# Patient Record
Sex: FEMALE | Race: WHITE | NOT HISPANIC OR LATINO | Employment: OTHER | URBAN - METROPOLITAN AREA
[De-identification: names, ages, dates, MRNs, and addresses within clinical notes are randomized per-mention and may not be internally consistent; named-entity substitution may affect disease eponyms.]

---

## 2018-11-08 ENCOUNTER — TRANSCRIBE ORDERS (OUTPATIENT)
Dept: NEUROSURGERY | Facility: CLINIC | Age: 73
End: 2018-11-08

## 2018-11-08 DIAGNOSIS — M54.50 LOWER BACK PAIN: Primary | ICD-10-CM

## 2018-11-09 RX ORDER — OXYCODONE HYDROCHLORIDE 5 MG/1
5 TABLET ORAL
Refills: 0 | COMMUNITY
Start: 2018-11-01

## 2018-11-09 RX ORDER — RANITIDINE 300 MG/1
300 TABLET ORAL
Refills: 2 | COMMUNITY
Start: 2018-10-25

## 2018-11-09 RX ORDER — OMEPRAZOLE 40 MG/1
CAPSULE, DELAYED RELEASE ORAL
Refills: 3 | COMMUNITY
Start: 2018-10-31

## 2018-11-09 RX ORDER — HYDROCHLOROTHIAZIDE 25 MG/1
25 TABLET ORAL EVERY MORNING
Refills: 3 | COMMUNITY
Start: 2018-10-03 | End: 2018-11-12

## 2018-11-09 RX ORDER — AMLODIPINE BESYLATE 5 MG/1
5 TABLET ORAL DAILY
Refills: 3 | COMMUNITY
Start: 2018-10-31

## 2018-11-09 RX ORDER — INFLUENZA A VIRUSA/MICHIGAN/45/2015 X-275 (H1N1) ANTIGEN (FORMALDEHYDE INACTIVATED), INFLUENZA A VIRUS A/HONG KONG/4801/2014 X-263B (H3N2) ANTIGEN (FORMALDEHYDE INACTIVATED), AND INFLUENZA B VIRUS B/BRISBANE/60/2008 ANTIGEN (FORMALDEHYDE INACTIVATED) 60; 60; 60 UG/.5ML; UG/.5ML; UG/.5ML
INJECTION, SUSPENSION INTRAMUSCULAR
Refills: 0 | COMMUNITY
Start: 2018-09-25 | End: 2018-11-12

## 2018-11-12 ENCOUNTER — OFFICE VISIT (OUTPATIENT)
Dept: NEUROSURGERY | Facility: CLINIC | Age: 73
End: 2018-11-12
Payer: MEDICARE

## 2018-11-12 VITALS
HEIGHT: 64 IN | HEART RATE: 66 BPM | RESPIRATION RATE: 16 BRPM | WEIGHT: 161 LBS | TEMPERATURE: 97.9 F | SYSTOLIC BLOOD PRESSURE: 110 MMHG | BODY MASS INDEX: 27.49 KG/M2 | DIASTOLIC BLOOD PRESSURE: 76 MMHG

## 2018-11-12 DIAGNOSIS — M46.1 SACROILIITIS, NOT ELSEWHERE CLASSIFIED (HCC): ICD-10-CM

## 2018-11-12 DIAGNOSIS — G89.4 CHRONIC PAIN SYNDROME: Primary | ICD-10-CM

## 2018-11-12 DIAGNOSIS — M54.50 LOWER BACK PAIN: ICD-10-CM

## 2018-11-12 DIAGNOSIS — S32.009G: ICD-10-CM

## 2018-11-12 DIAGNOSIS — M48.062 NEUROGENIC CLAUDICATION DUE TO LUMBAR SPINAL STENOSIS: ICD-10-CM

## 2018-11-12 PROCEDURE — 99204 OFFICE O/P NEW MOD 45 MIN: CPT | Performed by: NEUROLOGICAL SURGERY

## 2018-11-12 RX ORDER — LIDOCAINE 50 MG/G
PATCH TOPICAL
COMMUNITY
Start: 2016-05-19

## 2018-11-12 RX ORDER — BRIMONIDINE TARTRATE 0.15 %
1 DROPS OPHTHALMIC (EYE)
COMMUNITY

## 2018-11-12 RX ORDER — PETROLATUM,WHITE/LANOLIN
OINTMENT (GRAM) TOPICAL
COMMUNITY

## 2018-11-12 RX ORDER — ALBUTEROL SULFATE 4 MG/1
TABLET ORAL
COMMUNITY
Start: 2015-06-29

## 2018-11-12 RX ORDER — ASPIRIN 81 MG/1
81 TABLET ORAL
COMMUNITY

## 2018-11-12 RX ORDER — TRAVOPROST OPHTHALMIC SOLUTION 0.04 MG/ML
SOLUTION OPHTHALMIC
COMMUNITY
Start: 2015-06-02

## 2018-11-12 RX ORDER — CHLORAL HYDRATE 500 MG
1000 CAPSULE ORAL
COMMUNITY

## 2018-11-12 RX ORDER — EPINEPHRINE 0.3 MG/.3ML
INJECTION SUBCUTANEOUS
COMMUNITY
Start: 2016-06-02

## 2018-11-12 RX ORDER — COLESEVELAM HYDROCHLORIDE 3.75 G/1
POWDER, FOR SUSPENSION ORAL
COMMUNITY

## 2018-11-12 RX ORDER — NICOTINE POLACRILEX 2 MG
GUM BUCCAL
COMMUNITY

## 2018-11-12 RX ORDER — MONTELUKAST SODIUM 10 MG/1
TABLET ORAL
COMMUNITY
Start: 2015-06-26

## 2018-11-12 RX ORDER — PREDNISONE 1 MG/1
3 TABLET ORAL
COMMUNITY
Start: 2018-01-18

## 2018-11-12 RX ORDER — OSELTAMIVIR PHOSPHATE 75 MG/1
CAPSULE ORAL
COMMUNITY
Start: 2018-02-05

## 2018-11-12 NOTE — PROGRESS NOTES
Assessment/Plan:    No problem-specific Assessment & Plan notes found for this encounter  Diagnoses and all orders for this visit:    Chronic pain syndrome    Lower back pain  -     Ambulatory referral to Neurosurgery    Neurogenic claudication due to lumbar spinal stenosis    [facility], [facility]    Closed fracture of spinous process of lumbar vertebra with delayed healing    Other orders  -     amLODIPine (NORVASC) 5 mg tablet; Take 5 mg by mouth daily  -     Discontinue: hydrochlorothiazide (HYDRODIURIL) 25 mg tablet; Take 25 mg by mouth every morning  -     oxyCODONE (ROXICODONE) 5 mg immediate release tablet; Take 5 mg by mouth every 4-6 hours as needed  -     Discontinue: FLUZONE HIGH-DOSE 0 5 ML BILLIE; TO BE ADMINISTERED BY PHARMACIST FOR IMMUNIZATION  -     ranitidine (ZANTAC) 300 MG tablet; Take 300 mg by mouth daily at bedtime  -     omeprazole (PriLOSEC) 40 MG capsule; TAKE 1 CAPSULE BY MOUTH TWICE A DAY  -     albuterol 4 mg tablet;   -     aspirin (ECOTRIN LOW STRENGTH) 81 mg EC tablet; Take 81 mg by mouth  -     B Complex Vitamins (B COMPLEX 1 PO); Take 1 tablet by mouth daily  -     Biotin 1 MG CAPS; Take by mouth  -     Besifloxacin HCl (BESIVANCE) 0 6 % SUSP;   -     brimonidine (ALPHAGAN P) 0 15 % ophthalmic solution; 1 drop  -     Brinzolamide-Brimonidine 1-0 2 % SUSP; Apply 1 drop to eye  -     Calcium Carb-Cholecalciferol (CALCIUM CARBONATE-VITAMIN D3 PO); Take by mouth  -     Colesevelam HCl 3 75 g PACK; Take by mouth  -     EPINEPHrine (EPIPEN) 0 3 mg/0 3 mL SOAJ; Inject as directed  -     Flaxseed Oil OIL; Take 2 capsules by mouth  -     Garlic 10 MG CAPS; Take 2 capsules by mouth  -     Glucosamine Sulfate 1000 MG CAPS; Take by mouth  -     Krill Oil 300 MG CAPS; Take by mouth  -     lidocaine (LIDODERM) 5 %;  Apply topically  -     montelukast (SINGULAIR) 10 mg tablet;   -     MULTIPLE VITAMINS-MINERALS ER PO; Take by mouth  -     Omega-3 Fatty Acids (FISH OIL) 1,000 mg; Take 1,000 mg by mouth  -     oseltamivir (TAMIFLU) 75 mg capsule;   -     Plant Sterols and Stanols 450 MG CAPS; Take by mouth  -     Polyvinyl Alcohol-Povidone PF 1 4-0 6 % SOLN; Apply to eye  -     predniSONE 5 mg tablet; 3 mg  -     tocilizumab (ACTEMRA) 400 mg/20 mL injection; Infuse into a venous catheter  -     travoprost (TRAVATAN Z) 0 004 % ophthalmic solution;   -     vitamin E, tocopherol, 200 units capsule; Take 400 Units by mouth daily       Summary: This is a 54-year-old female with a history of lumbar stenosis with neurogenic claudication  She is also known to have very poor bone quality given 4 years of use for prednisone for her giant cell arteritis  She is approximately 6 weeks from placement of an Vertiflex Superion interspinous device at L2-3 and L3-4 by Dr John Aguilar  She reports having near complete resolution of her pre- procedure symptoms which included aching low back pain, difficulty standing and walking, and sharp pain in her left leg  She is now most concerned with a right-sided upper buttock paraspinal sharp pain which is present regardless of position  She subsequently developed a spinous process fracture at L2 after placement of the device  She appears to have had increased pain several days following the procedure that she described as dull and aching, but this resolved  There are no treatments for spinous process fractures  Conservative measures are utilized  It appears that the discomfort from the spinous process fracture has resolved as she denies any midline pain, as well as dull or aching axial/mechanical pain  She has had complete resolution of her neurogenic claudication symptoms and will need to work with physical therapy for her deconditioning  Her CT imaging demonstrates that the device is in appropriate position at both levels  Spinous process fracture is in line with the L2-3 interspaceer device, but it has not dislodged    The fracture is well aligned  Given these findings, resolution of pre-procedure symptoms,  and lack of pain from fracture, I have recommended observation  I would recommend she follow up with her pain specialist, Dr Ivone Pink, who she has seen in the past for SI joint injections on the right side  She reports this present pain pattern is consistent with her sacroiliitis  Further discussion was held with the patient and wife in reference to poor bone quality and the options of open surgery which could potentially include decompression, and/or fusion  I would need to review MR imaging of her lumbar spine to  the degree of stenosis  However, I do not feel that this is a viable option for her given her prednisone use and autoimmune injection medication  She will follow-up as needed with me  Greater than 65 min with the patient in which greater than 50% in care coordination  Subjective:      Patient ID: Lorraine Carvajal is a 68 y o  female  HPI     This is a very pleasant 60-year-old female with a history of lower back pain, as well as difficulty with standing and walking  She was found to have lumbar spinal stenosis with neurogenic claudication  She is now approximately 6 weeks from placement of an Vertiflex Superion interspinous device at L2-3 and L3-4 by Dr Yamini Bradford  She subsequently developed a spinous process fracture at L2 after placement of the device  She reports that her chronic symptoms that included an aching lower back pain, as well as sharp left leg pain, and difficulty standing and walking have resolved since implantation of the device  She reports previously she cannot stand for greater than 5 min for which now she has increased her activity level  Subsequent to the procedure she developed a new sharp pain in the midline of her lower back that was present for several days and then resolved    She is now reporting a right-sided upper buttock pain which is sharp in nature and is always present regardless of position  This pain started several weeks after the procedure  Denies leg pain  She has a history of right-sided SI joint dysfunction and has undergone SI joint injections in the past with significant relief  She has a complicated history of giant cell arteritis and has been on prednisone for 4 years, as well as an autoimmune injection  She has avascular necrosis of the right shoulder  She has a history of a thyroid plasty  She also reports problems with her bilateral knees  She had a laproscopic cholecystectomy performed 3 weeks ago as well  She takes oxycodone 3-4 times per day for her pain  The following portions of the patient's history were reviewed and updated as appropriate: allergies, current medications, past family history, past medical history, past social history and past surgical history  Review of Systems   Constitutional: Positive for appetite change (loss of appetite) and fatigue  HENT:        Thyroidplasty   Eyes:        Glacoma   Dry eye   Respiratory:        Asthma  O2 on hold by pulmonary   Cardiovascular:        Vasovagal syncope   Gastrointestinal: Positive for constipation, nausea and vomiting (about every morning when waking up)  Gall bladder removal sx   Endocrine: Negative  Musculoskeletal: Positive for back pain ( 2 Vertiflex placed for lBP on 9/19/18) and gait problem (bilateral leg weakness)  Skin: Negative  Allergic/Immunologic: Positive for environmental allergies and food allergies (cocoa, dairy)  Neurological: Positive for dizziness, syncope (three days ago reports she felt she was goiong to pass out ) and weakness (bilateral leg)  Hematological:        Fish oil  Krill  Flaxseed oil  ASA 81   Psychiatric/Behavioral: Negative            Objective:      /76 (BP Location: Left arm)   Pulse 66   Temp 97 9 °F (36 6 °C) (Tympanic)   Resp 16   Ht 5' 4" (1 626 m)   Wt 73 kg (161 lb)   BMI 27 64 kg/m²          Physical Exam Constitutional: She is oriented to person, place, and time  She appears well-developed  HENT:   Head: Normocephalic  Eyes: Pupils are equal, round, and reactive to light  Neck: Normal range of motion  Pulmonary/Chest: Effort normal    Musculoskeletal: Normal range of motion  Neurological: She is alert and oriented to person, place, and time  She has normal strength  No sensory deficit  nontender to palpation  positive compression test and thigh thrust             Results:   Reviewed imaging of lumbar x-rays September 2018  There is an interspacer device at L2-3 and L3-4  There is a spinous process fracture at the level of L2 in line with the fracure  Device slightly tilted  Reviewed imaging and report of CT lumbar spine October 2018  There is a Vertiflex Superion interspacer device at L2-3 and L3-4  There is a spinous process fracture at the level of L2 in line with the fracure  Device slightly tilted  The device is not dislodged and remains in place

## 2019-08-07 ENCOUNTER — HOSPITAL ENCOUNTER (OUTPATIENT)
Dept: RADIOLOGY | Facility: HOSPITAL | Age: 74
Discharge: HOME | End: 2019-08-07
Attending: RADIOLOGY

## 2019-08-07 DIAGNOSIS — Z76.89 REFERRAL OF PATIENT WITHOUT EXAMINATION OR TREATMENT: ICD-10-CM

## 2024-04-15 ENCOUNTER — HOSPITAL ENCOUNTER (OUTPATIENT)
Dept: HOSPITAL 99 - RPT | Age: 79
Discharge: HOME | End: 2024-04-15
Payer: MEDICARE

## 2024-04-15 DIAGNOSIS — I89.0: Primary | ICD-10-CM

## 2024-04-15 DIAGNOSIS — Z73.6: ICD-10-CM

## 2024-07-19 ENCOUNTER — HOSPITAL ENCOUNTER (OUTPATIENT)
Dept: HOSPITAL 99 - RAD | Age: 79
End: 2024-07-19
Payer: MEDICARE

## 2024-07-19 DIAGNOSIS — M19.071: Primary | ICD-10-CM

## 2024-09-26 ENCOUNTER — HOSPITAL ENCOUNTER (OUTPATIENT)
Dept: HOSPITAL 99 - RAD | Age: 79
End: 2024-09-26
Payer: MEDICARE

## 2024-09-26 DIAGNOSIS — R06.02: ICD-10-CM

## 2024-09-26 DIAGNOSIS — D86.9: ICD-10-CM

## 2024-09-26 DIAGNOSIS — R06.09: ICD-10-CM

## 2024-09-26 DIAGNOSIS — I48.0: Primary | ICD-10-CM

## 2024-09-26 DIAGNOSIS — R42: ICD-10-CM

## 2024-09-26 PROCEDURE — A9500 TC99M SESTAMIBI: HCPCS

## 2024-10-09 ENCOUNTER — HOSPITAL ENCOUNTER (OUTPATIENT)
Dept: HOSPITAL 99 - HWRCS | Age: 79
End: 2024-10-09
Payer: MEDICARE

## 2024-10-09 DIAGNOSIS — R06.09: ICD-10-CM

## 2024-10-09 DIAGNOSIS — I48.0: Primary | ICD-10-CM

## 2024-11-18 ENCOUNTER — HOSPITAL ENCOUNTER (EMERGENCY)
Dept: HOSPITAL 99 - EMR | Age: 79
Discharge: HOME | End: 2024-11-18
Payer: MEDICARE

## 2024-11-18 VITALS — DIASTOLIC BLOOD PRESSURE: 88 MMHG | SYSTOLIC BLOOD PRESSURE: 146 MMHG | RESPIRATION RATE: 16 BRPM

## 2024-11-18 VITALS — DIASTOLIC BLOOD PRESSURE: 64 MMHG | SYSTOLIC BLOOD PRESSURE: 130 MMHG

## 2024-11-18 VITALS — DIASTOLIC BLOOD PRESSURE: 79 MMHG | SYSTOLIC BLOOD PRESSURE: 148 MMHG

## 2024-11-18 VITALS — BODY MASS INDEX: 39.1 KG/M2

## 2024-11-18 VITALS — RESPIRATION RATE: 20 BRPM

## 2024-11-18 VITALS — RESPIRATION RATE: 19 BRPM

## 2024-11-18 DIAGNOSIS — Z79.52: ICD-10-CM

## 2024-11-18 DIAGNOSIS — H40.9: ICD-10-CM

## 2024-11-18 DIAGNOSIS — E78.00: ICD-10-CM

## 2024-11-18 DIAGNOSIS — Z87.891: ICD-10-CM

## 2024-11-18 DIAGNOSIS — G47.33: ICD-10-CM

## 2024-11-18 DIAGNOSIS — Z90.49: ICD-10-CM

## 2024-11-18 DIAGNOSIS — Z96.611: ICD-10-CM

## 2024-11-18 DIAGNOSIS — I48.91: ICD-10-CM

## 2024-11-18 DIAGNOSIS — J45.909: ICD-10-CM

## 2024-11-18 DIAGNOSIS — I10: ICD-10-CM

## 2024-11-18 DIAGNOSIS — M31.6: ICD-10-CM

## 2024-11-18 DIAGNOSIS — Z86.73: ICD-10-CM

## 2024-11-18 DIAGNOSIS — R10.9: Primary | ICD-10-CM

## 2024-11-18 LAB
ALBUMIN SERPL-MCNC: 4.7 G/DL (ref 3.5–5)
ALP SERPL-CCNC: 50 U/L (ref 38–126)
ALT SERPL-CCNC: 50 U/L (ref 0–35)
AST SERPL-CCNC: 43 U/L (ref 14–36)
BUN SERPL-MCNC: 24 MG/DL (ref 7–17)
CALCIUM SERPL-MCNC: 9.2 MG/DL (ref 8.4–10.2)
CHLORIDE SERPL-SCNC: 101 MMOL/L (ref 98–107)
CO2 SERPL-SCNC: 29 MMOL/L (ref 22–30)
EGFR: > 60
ERYTHROCYTE [DISTWIDTH] IN BLOOD BY AUTOMATED COUNT: 13 % (ref 11.5–14.5)
GLUCOSE SERPL-MCNC: 117 MG/DL (ref 70–99)
HCT VFR BLD AUTO: 42.3 % (ref 37–47)
HGB BLD-MCNC: 13.8 G/DL (ref 12–16)
LIPASE SERPL-CCNC: 86 U/L (ref 23–300)
MCHC RBC AUTO-ENTMCNC: 32.6 G/DL (ref 33–37)
MCV RBC AUTO: 108.5 FL (ref 81–99)
NRBC BLD AUTO-RTO: 0 %
PLATELET # BLD AUTO: 147 10^3/UL (ref 130–400)
POTASSIUM SERPL-SCNC: 4.6 MMOL/L (ref 3.5–5.1)
PROT SERPL-MCNC: 6.8 G/DL (ref 6.3–8.2)
SODIUM SERPL-SCNC: 139 MMOL/L (ref 135–145)
URINE RED BLOOD CELL: (no result) /HPF (ref 0–2)
URINE WHITE CELL: (no result) /HPF (ref 0–5)

## 2024-11-18 PROCEDURE — 96375 TX/PRO/DX INJ NEW DRUG ADDON: CPT

## 2024-11-18 PROCEDURE — 96374 THER/PROPH/DIAG INJ IV PUSH: CPT

## 2024-11-18 PROCEDURE — 99284 EMERGENCY DEPT VISIT MOD MDM: CPT

## 2024-11-18 RX ADMIN — ONDANSETRON HYDROCHLORIDE 4 MG: 2 SOLUTION INTRAMUSCULAR; INTRAVENOUS at 16:14

## 2024-11-18 RX ADMIN — HYDROMORPHONE HYDROCHLORIDE 0.5 MG: 1 INJECTION, SOLUTION INTRAMUSCULAR; INTRAVENOUS; SUBCUTANEOUS at 16:14

## 2024-11-20 VITALS — RESPIRATION RATE: 18 BRPM

## 2024-11-20 VITALS — RESPIRATION RATE: 39 BRPM

## 2024-11-20 VITALS — RESPIRATION RATE: 14 BRPM

## 2024-11-20 VITALS — RESPIRATION RATE: 14 BRPM | SYSTOLIC BLOOD PRESSURE: 140 MMHG | DIASTOLIC BLOOD PRESSURE: 67 MMHG

## 2024-11-20 VITALS — SYSTOLIC BLOOD PRESSURE: 170 MMHG | DIASTOLIC BLOOD PRESSURE: 75 MMHG | RESPIRATION RATE: 24 BRPM

## 2024-11-20 VITALS — RESPIRATION RATE: 15 BRPM | DIASTOLIC BLOOD PRESSURE: 61 MMHG | SYSTOLIC BLOOD PRESSURE: 130 MMHG

## 2024-11-20 VITALS — RESPIRATION RATE: 12 BRPM | SYSTOLIC BLOOD PRESSURE: 154 MMHG | DIASTOLIC BLOOD PRESSURE: 81 MMHG

## 2024-11-20 VITALS — RESPIRATION RATE: 11 BRPM

## 2024-11-20 VITALS — RESPIRATION RATE: 17 BRPM | SYSTOLIC BLOOD PRESSURE: 143 MMHG | DIASTOLIC BLOOD PRESSURE: 69 MMHG

## 2024-11-20 VITALS — DIASTOLIC BLOOD PRESSURE: 75 MMHG | SYSTOLIC BLOOD PRESSURE: 170 MMHG

## 2024-11-20 VITALS — RESPIRATION RATE: 15 BRPM

## 2024-11-20 VITALS — DIASTOLIC BLOOD PRESSURE: 80 MMHG | RESPIRATION RATE: 18 BRPM | SYSTOLIC BLOOD PRESSURE: 147 MMHG

## 2024-11-20 VITALS — RESPIRATION RATE: 18 BRPM | SYSTOLIC BLOOD PRESSURE: 143 MMHG | DIASTOLIC BLOOD PRESSURE: 75 MMHG

## 2024-11-20 VITALS — RESPIRATION RATE: 12 BRPM

## 2024-11-20 VITALS — RESPIRATION RATE: 9 BRPM

## 2024-11-20 VITALS — RESPIRATION RATE: 7 BRPM

## 2024-11-20 VITALS — RESPIRATION RATE: 21 BRPM

## 2024-11-20 LAB
ALBUMIN SERPL-MCNC: 4.6 G/DL (ref 3.5–5)
ALP SERPL-CCNC: 52 U/L (ref 38–126)
ALT SERPL-CCNC: 107 U/L (ref 0–35)
AST SERPL-CCNC: 79 U/L (ref 14–36)
BUN SERPL-MCNC: 18 MG/DL (ref 7–17)
CALCIUM SERPL-MCNC: 8.9 MG/DL (ref 8.4–10.2)
CHLORIDE SERPL-SCNC: 101 MMOL/L (ref 98–107)
CO2 SERPL-SCNC: 28 MMOL/L (ref 22–30)
EGFR: > 60
ERYTHROCYTE [DISTWIDTH] IN BLOOD BY AUTOMATED COUNT: 12.8 % (ref 11.5–14.5)
ESTIMATED CREATININE CLEARANCE: 73 ML/MIN
GLUCOSE SERPL-MCNC: 122 MG/DL (ref 70–99)
HCT VFR BLD AUTO: 42.2 % (ref 37–47)
HGB BLD-MCNC: 13.6 G/DL (ref 12–16)
MCHC RBC AUTO-ENTMCNC: 32.2 G/DL (ref 33–37)
MCV RBC AUTO: 109.6 FL (ref 81–99)
NRBC BLD AUTO-RTO: 0 %
PLATELET # BLD AUTO: 152 10^3/UL (ref 130–400)
POTASSIUM SERPL-SCNC: 4.1 MMOL/L (ref 3.5–5.1)
PROT SERPL-MCNC: 6.8 G/DL (ref 6.3–8.2)
SODIUM SERPL-SCNC: 139 MMOL/L (ref 135–145)

## 2024-11-20 RX ADMIN — GABAPENTIN 200 MG: 100 CAPSULE ORAL at 20:50

## 2024-11-20 RX ADMIN — BRINZOLAMIDE/BRIMONIDINE TARTRATE 1 DROP: 10; 2 SUSPENSION/ DROPS OPHTHALMIC at 18:17

## 2024-11-20 RX ADMIN — KETOROLAC TROMETHAMINE 10 MG: 15 INJECTION, SOLUTION INTRAMUSCULAR; INTRAVENOUS at 20:59

## 2024-11-20 RX ADMIN — POLYVINYL ALCOHOL, POVIDONE 2 DROPS: 14; 6 SOLUTION/ DROPS OPHTHALMIC at 17:55

## 2024-11-20 RX ADMIN — POLYETHYLENE GLYCOL 3350 5 GRAMS: 17 POWDER, FOR SOLUTION ORAL at 20:51

## 2024-11-20 RX ADMIN — HYDROMORPHONE HYDROCHLORIDE 1 MG: 1 INJECTION, SOLUTION INTRAMUSCULAR; INTRAVENOUS; SUBCUTANEOUS at 10:21

## 2024-11-20 RX ADMIN — HYDROMORPHONE HYDROCHLORIDE 0.5 MG: 1 INJECTION, SOLUTION INTRAMUSCULAR; INTRAVENOUS; SUBCUTANEOUS at 22:28

## 2024-11-20 RX ADMIN — BRINZOLAMIDE/BRIMONIDINE TARTRATE 1 DROP: 10; 2 SUSPENSION/ DROPS OPHTHALMIC at 20:52

## 2024-11-20 RX ADMIN — HYDROMORPHONE HYDROCHLORIDE 0.5 MG: 1 INJECTION, SOLUTION INTRAMUSCULAR; INTRAVENOUS; SUBCUTANEOUS at 17:48

## 2024-11-20 RX ADMIN — POLYVINYL ALCOHOL, POVIDONE 2 DROPS: 14; 6 SOLUTION/ DROPS OPHTHALMIC at 17:50

## 2024-11-20 RX ADMIN — GABAPENTIN 400 MG: 400 CAPSULE ORAL at 20:50

## 2024-11-20 RX ADMIN — POLYETHYLENE GLYCOL 3350 5 GRAMS: 17 POWDER, FOR SOLUTION ORAL at 17:43

## 2024-11-20 RX ADMIN — RIVAROXABAN 20 MG: 20 TABLET, FILM COATED ORAL at 17:45

## 2024-11-20 RX ADMIN — GABAPENTIN 400 MG: 400 CAPSULE ORAL at 17:44

## 2024-11-20 RX ADMIN — LATANOPROST 1 DROP: 50 SOLUTION OPHTHALMIC at 20:52

## 2024-11-20 RX ADMIN — POLYVINYL ALCOHOL, POVIDONE 2 DROPS: 14; 6 SOLUTION/ DROPS OPHTHALMIC at 20:51

## 2024-11-21 ENCOUNTER — HOSPITAL ENCOUNTER (INPATIENT)
Dept: HOSPITAL 99 - 4 EAST ACU | Age: 79
LOS: 2 days | Discharge: HOME HEALTH | DRG: 558 | End: 2024-11-23
Payer: MEDICARE

## 2024-11-21 VITALS — DIASTOLIC BLOOD PRESSURE: 78 MMHG | RESPIRATION RATE: 16 BRPM | SYSTOLIC BLOOD PRESSURE: 132 MMHG

## 2024-11-21 VITALS — DIASTOLIC BLOOD PRESSURE: 83 MMHG | RESPIRATION RATE: 14 BRPM | SYSTOLIC BLOOD PRESSURE: 151 MMHG

## 2024-11-21 VITALS — BODY MASS INDEX: 36.5 KG/M2 | BODY MASS INDEX: 38.1 KG/M2

## 2024-11-21 VITALS — RESPIRATION RATE: 16 BRPM | SYSTOLIC BLOOD PRESSURE: 116 MMHG | DIASTOLIC BLOOD PRESSURE: 61 MMHG

## 2024-11-21 DIAGNOSIS — I10: ICD-10-CM

## 2024-11-21 DIAGNOSIS — J45.909: ICD-10-CM

## 2024-11-21 DIAGNOSIS — Z79.01: ICD-10-CM

## 2024-11-21 DIAGNOSIS — G47.33: ICD-10-CM

## 2024-11-21 DIAGNOSIS — D84.9: ICD-10-CM

## 2024-11-21 DIAGNOSIS — M71.38: Primary | ICD-10-CM

## 2024-11-21 DIAGNOSIS — H40.9: ICD-10-CM

## 2024-11-21 DIAGNOSIS — I48.0: ICD-10-CM

## 2024-11-21 DIAGNOSIS — M31.6: ICD-10-CM

## 2024-11-21 LAB
ALBUMIN SERPL-MCNC: 3.7 G/DL (ref 3.5–5)
ALP SERPL-CCNC: 77 U/L (ref 38–126)
ALT SERPL-CCNC: 108 U/L (ref 0–35)
AST SERPL-CCNC: 60 U/L (ref 14–36)
BUN SERPL-MCNC: 17 MG/DL (ref 7–17)
CALCIUM SERPL-MCNC: 9 MG/DL (ref 8.4–10.2)
CHLORIDE SERPL-SCNC: 104 MMOL/L (ref 98–107)
CO2 SERPL-SCNC: 26 MMOL/L (ref 22–30)
EGFR: > 60
ERYTHROCYTE [DISTWIDTH] IN BLOOD BY AUTOMATED COUNT: 12.6 % (ref 11.5–14.5)
ESTIMATED CREATININE CLEARANCE: 71 ML/MIN
GLUCOSE - POINT OF CARE: 95 MG/DL (ref 70–99)
GLUCOSE SERPL-MCNC: 93 MG/DL (ref 70–99)
HCT VFR BLD AUTO: 39.8 % (ref 37–47)
HGB BLD-MCNC: 12.8 G/DL (ref 12–16)
MCHC RBC AUTO-ENTMCNC: 32.2 G/DL (ref 33–37)
MCV RBC AUTO: 108.4 FL (ref 81–99)
NRBC BLD AUTO-RTO: 0 %
PLATELET # BLD AUTO: 146 10^3/UL (ref 130–400)
POTASSIUM SERPL-SCNC: 4.1 MMOL/L (ref 3.5–5.1)
PROT SERPL-MCNC: 5.7 G/DL (ref 6.3–8.2)
SODIUM SERPL-SCNC: 138 MMOL/L (ref 135–145)

## 2024-11-21 PROCEDURE — A9575 INJ GADOTERATE MEGLUMI 0.1ML: HCPCS

## 2024-11-21 RX ADMIN — GABAPENTIN 400 MG: 400 CAPSULE ORAL at 12:46

## 2024-11-21 RX ADMIN — POLYVINYL ALCOHOL, POVIDONE 2 DROPS: 14; 6 SOLUTION/ DROPS OPHTHALMIC at 17:00

## 2024-11-21 RX ADMIN — GABAPENTIN 200 MG: 100 CAPSULE ORAL at 21:26

## 2024-11-21 RX ADMIN — BRINZOLAMIDE/BRIMONIDINE TARTRATE 1 DROP: 10; 2 SUSPENSION/ DROPS OPHTHALMIC at 13:16

## 2024-11-21 RX ADMIN — RIVAROXABAN 20 MG: 20 TABLET, FILM COATED ORAL at 16:59

## 2024-11-21 RX ADMIN — POLYVINYL ALCOHOL, POVIDONE 2 DROPS: 14; 6 SOLUTION/ DROPS OPHTHALMIC at 12:46

## 2024-11-21 RX ADMIN — POLYVINYL ALCOHOL, POVIDONE 2 DROPS: 14; 6 SOLUTION/ DROPS OPHTHALMIC at 21:25

## 2024-11-21 RX ADMIN — POLYETHYLENE GLYCOL 3350 5 GRAMS: 17 POWDER, FOR SOLUTION ORAL at 09:57

## 2024-11-21 RX ADMIN — KETOROLAC TROMETHAMINE 10 MG: 15 INJECTION, SOLUTION INTRAMUSCULAR; INTRAVENOUS at 14:32

## 2024-11-21 RX ADMIN — Medication 1000 MG: at 09:57

## 2024-11-21 RX ADMIN — HYDROMORPHONE HYDROCHLORIDE 0.5 MG: 1 INJECTION, SOLUTION INTRAMUSCULAR; INTRAVENOUS; SUBCUTANEOUS at 06:42

## 2024-11-21 RX ADMIN — BRINZOLAMIDE/BRIMONIDINE TARTRATE 1 DROP: 10; 2 SUSPENSION/ DROPS OPHTHALMIC at 17:02

## 2024-11-21 RX ADMIN — KETOROLAC TROMETHAMINE 10 MG: 15 INJECTION, SOLUTION INTRAMUSCULAR; INTRAVENOUS at 03:15

## 2024-11-21 RX ADMIN — HYDROMORPHONE HYDROCHLORIDE 0.5 MG: 1 INJECTION, SOLUTION INTRAMUSCULAR; INTRAVENOUS; SUBCUTANEOUS at 10:49

## 2024-11-21 RX ADMIN — POLYVINYL ALCOHOL, POVIDONE: 14; 6 SOLUTION/ DROPS OPHTHALMIC at 17:06

## 2024-11-21 RX ADMIN — GABAPENTIN 400 MG: 400 CAPSULE ORAL at 09:56

## 2024-11-21 RX ADMIN — POLYETHYLENE GLYCOL 3350 5 GRAMS: 17 POWDER, FOR SOLUTION ORAL at 17:00

## 2024-11-21 RX ADMIN — GABAPENTIN 400 MG: 400 CAPSULE ORAL at 16:59

## 2024-11-21 RX ADMIN — BRINZOLAMIDE/BRIMONIDINE TARTRATE 1 DROP: 10; 2 SUSPENSION/ DROPS OPHTHALMIC at 09:58

## 2024-11-21 RX ADMIN — POLYVINYL ALCOHOL, POVIDONE 2 DROPS: 14; 6 SOLUTION/ DROPS OPHTHALMIC at 09:57

## 2024-11-21 RX ADMIN — METOPROLOL SUCCINATE 25 MG: 25 TABLET, FILM COATED, EXTENDED RELEASE ORAL at 09:58

## 2024-11-21 RX ADMIN — KETOROLAC TROMETHAMINE 10 MG: 15 INJECTION, SOLUTION INTRAMUSCULAR; INTRAVENOUS at 23:02

## 2024-11-21 RX ADMIN — Medication 400 UNITS: at 09:57

## 2024-11-21 RX ADMIN — GABAPENTIN 400 MG: 400 CAPSULE ORAL at 21:26

## 2024-11-21 RX ADMIN — PREDNISONE 5 MG: 5 TABLET ORAL at 09:57

## 2024-11-21 RX ADMIN — PANTOPRAZOLE SODIUM 40 MG: 40 TABLET, DELAYED RELEASE ORAL at 09:57

## 2024-11-21 RX ADMIN — POLYETHYLENE GLYCOL 3350 5 GRAMS: 17 POWDER, FOR SOLUTION ORAL at 21:25

## 2024-11-21 RX ADMIN — HYDROMORPHONE HYDROCHLORIDE 0.5 MG: 1 INJECTION, SOLUTION INTRAMUSCULAR; INTRAVENOUS; SUBCUTANEOUS at 20:25

## 2024-11-21 RX ADMIN — LATANOPROST 1 DROP: 50 SOLUTION OPHTHALMIC at 21:27

## 2024-11-22 VITALS — DIASTOLIC BLOOD PRESSURE: 77 MMHG | HEART RATE: 71 BPM | SYSTOLIC BLOOD PRESSURE: 129 MMHG | OXYGEN SATURATION: 92 %

## 2024-11-22 VITALS — SYSTOLIC BLOOD PRESSURE: 106 MMHG | RESPIRATION RATE: 18 BRPM | DIASTOLIC BLOOD PRESSURE: 88 MMHG

## 2024-11-22 VITALS — SYSTOLIC BLOOD PRESSURE: 166 MMHG | RESPIRATION RATE: 18 BRPM | DIASTOLIC BLOOD PRESSURE: 70 MMHG

## 2024-11-22 VITALS — DIASTOLIC BLOOD PRESSURE: 89 MMHG | RESPIRATION RATE: 18 BRPM | SYSTOLIC BLOOD PRESSURE: 169 MMHG

## 2024-11-22 RX ADMIN — PREDNISONE 5 MG: 5 TABLET ORAL at 08:57

## 2024-11-22 RX ADMIN — Medication 1000 MG: at 08:56

## 2024-11-22 RX ADMIN — TRAMADOL HYDROCHLORIDE 25 MG: 50 TABLET, COATED ORAL at 16:55

## 2024-11-22 RX ADMIN — Medication 400 UNITS: at 08:56

## 2024-11-22 RX ADMIN — GABAPENTIN 400 MG: 400 CAPSULE ORAL at 12:53

## 2024-11-22 RX ADMIN — BRINZOLAMIDE/BRIMONIDINE TARTRATE 1 DROP: 10; 2 SUSPENSION/ DROPS OPHTHALMIC at 12:53

## 2024-11-22 RX ADMIN — POLYVINYL ALCOHOL, POVIDONE 2 DROPS: 14; 6 SOLUTION/ DROPS OPHTHALMIC at 08:57

## 2024-11-22 RX ADMIN — POLYETHYLENE GLYCOL 3350 5 GRAMS: 17 POWDER, FOR SOLUTION ORAL at 08:56

## 2024-11-22 RX ADMIN — BRINZOLAMIDE/BRIMONIDINE TARTRATE 1 DROP: 10; 2 SUSPENSION/ DROPS OPHTHALMIC at 16:55

## 2024-11-22 RX ADMIN — Medication 1 FLUSH: at 13:12

## 2024-11-22 RX ADMIN — HYDROMORPHONE HYDROCHLORIDE 0.5 MG: 1 INJECTION, SOLUTION INTRAMUSCULAR; INTRAVENOUS; SUBCUTANEOUS at 06:35

## 2024-11-22 RX ADMIN — KETOROLAC TROMETHAMINE 10 MG: 15 INJECTION, SOLUTION INTRAMUSCULAR; INTRAVENOUS at 20:02

## 2024-11-22 RX ADMIN — GABAPENTIN 400 MG: 400 CAPSULE ORAL at 21:12

## 2024-11-22 RX ADMIN — GABAPENTIN 200 MG: 100 CAPSULE ORAL at 21:12

## 2024-11-22 RX ADMIN — METOPROLOL SUCCINATE 25 MG: 25 TABLET, FILM COATED, EXTENDED RELEASE ORAL at 08:56

## 2024-11-22 RX ADMIN — POLYETHYLENE GLYCOL 3350 5 GRAMS: 17 POWDER, FOR SOLUTION ORAL at 16:54

## 2024-11-22 RX ADMIN — DIPHENHYDRAMINE HYDROCHLORIDE 25 MG: 25 CAPSULE ORAL at 21:22

## 2024-11-22 RX ADMIN — PANTOPRAZOLE SODIUM 40 MG: 40 TABLET, DELAYED RELEASE ORAL at 08:57

## 2024-11-22 RX ADMIN — POLYVINYL ALCOHOL, POVIDONE: 14; 6 SOLUTION/ DROPS OPHTHALMIC at 16:05

## 2024-11-22 RX ADMIN — RIVAROXABAN 20 MG: 20 TABLET, FILM COATED ORAL at 17:20

## 2024-11-22 RX ADMIN — GABAPENTIN 400 MG: 400 CAPSULE ORAL at 08:57

## 2024-11-22 RX ADMIN — KETOROLAC TROMETHAMINE 10 MG: 15 INJECTION, SOLUTION INTRAMUSCULAR; INTRAVENOUS at 09:02

## 2024-11-22 RX ADMIN — LATANOPROST 1 DROP: 50 SOLUTION OPHTHALMIC at 21:13

## 2024-11-22 RX ADMIN — POLYVINYL ALCOHOL, POVIDONE 2 DROPS: 14; 6 SOLUTION/ DROPS OPHTHALMIC at 12:54

## 2024-11-22 RX ADMIN — GABAPENTIN 400 MG: 400 CAPSULE ORAL at 17:20

## 2024-11-22 RX ADMIN — BRINZOLAMIDE/BRIMONIDINE TARTRATE 1 DROP: 10; 2 SUSPENSION/ DROPS OPHTHALMIC at 09:02

## 2024-11-22 RX ADMIN — POLYVINYL ALCOHOL, POVIDONE 2 DROPS: 14; 6 SOLUTION/ DROPS OPHTHALMIC at 21:13

## 2024-11-22 RX ADMIN — POLYVINYL ALCOHOL, POVIDONE 2 DROPS: 14; 6 SOLUTION/ DROPS OPHTHALMIC at 17:21

## 2024-11-22 RX ADMIN — HYDROMORPHONE HYDROCHLORIDE 0.5 MG: 1 INJECTION, SOLUTION INTRAMUSCULAR; INTRAVENOUS; SUBCUTANEOUS at 13:12

## 2024-11-22 RX ADMIN — POLYETHYLENE GLYCOL 3350 5 GRAMS: 17 POWDER, FOR SOLUTION ORAL at 21:13

## 2024-11-23 VITALS — DIASTOLIC BLOOD PRESSURE: 82 MMHG | RESPIRATION RATE: 20 BRPM | SYSTOLIC BLOOD PRESSURE: 153 MMHG

## 2024-11-23 VITALS — SYSTOLIC BLOOD PRESSURE: 153 MMHG | DIASTOLIC BLOOD PRESSURE: 82 MMHG | RESPIRATION RATE: 20 BRPM

## 2024-11-23 LAB
ALBUMIN SERPL-MCNC: 3.9 G/DL (ref 3.5–5)
ALP SERPL-CCNC: 55 U/L (ref 38–126)
ALT SERPL-CCNC: 73 U/L (ref 0–35)
AST SERPL-CCNC: 35 U/L (ref 14–36)
BUN SERPL-MCNC: 29 MG/DL (ref 7–17)
CALCIUM SERPL-MCNC: 9.6 MG/DL (ref 8.4–10.2)
CHLORIDE SERPL-SCNC: 104 MMOL/L (ref 98–107)
CO2 SERPL-SCNC: 26 MMOL/L (ref 22–30)
EGFR: > 60
ERYTHROCYTE [DISTWIDTH] IN BLOOD BY AUTOMATED COUNT: 12.5 % (ref 11.5–14.5)
ESTIMATED CREATININE CLEARANCE: 62 ML/MIN
GLUCOSE SERPL-MCNC: 101 MG/DL (ref 70–99)
HCT VFR BLD AUTO: 39.9 % (ref 37–47)
HGB BLD-MCNC: 13.6 G/DL (ref 12–16)
MCHC RBC AUTO-ENTMCNC: 34.1 G/DL (ref 33–37)
MCV RBC AUTO: 106.4 FL (ref 81–99)
NRBC BLD AUTO-RTO: 0 %
PLATELET # BLD AUTO: 149 10^3/UL (ref 130–400)
POTASSIUM SERPL-SCNC: 4.3 MMOL/L (ref 3.5–5.1)
PROT SERPL-MCNC: 6 G/DL (ref 6.3–8.2)
SODIUM SERPL-SCNC: 140 MMOL/L (ref 135–145)

## 2024-11-23 RX ADMIN — Medication 400 UNITS: at 08:52

## 2024-11-23 RX ADMIN — BRINZOLAMIDE/BRIMONIDINE TARTRATE 1 DROP: 10; 2 SUSPENSION/ DROPS OPHTHALMIC at 12:09

## 2024-11-23 RX ADMIN — PANTOPRAZOLE SODIUM 40 MG: 40 TABLET, DELAYED RELEASE ORAL at 08:52

## 2024-11-23 RX ADMIN — TRAMADOL HYDROCHLORIDE 25 MG: 50 TABLET, COATED ORAL at 04:04

## 2024-11-23 RX ADMIN — POLYVINYL ALCOHOL, POVIDONE 2 DROPS: 14; 6 SOLUTION/ DROPS OPHTHALMIC at 12:09

## 2024-11-23 RX ADMIN — GABAPENTIN 400 MG: 400 CAPSULE ORAL at 12:09

## 2024-11-23 RX ADMIN — TRAMADOL HYDROCHLORIDE 25 MG: 50 TABLET, COATED ORAL at 15:35

## 2024-11-23 RX ADMIN — POLYVINYL ALCOHOL, POVIDONE 2 DROPS: 14; 6 SOLUTION/ DROPS OPHTHALMIC at 08:53

## 2024-11-23 RX ADMIN — POLYVINYL ALCOHOL, POVIDONE: 14; 6 SOLUTION/ DROPS OPHTHALMIC at 15:37

## 2024-11-23 RX ADMIN — METOPROLOL SUCCINATE 25 MG: 25 TABLET, FILM COATED, EXTENDED RELEASE ORAL at 08:52

## 2024-11-23 RX ADMIN — BRINZOLAMIDE/BRIMONIDINE TARTRATE 1 DROP: 10; 2 SUSPENSION/ DROPS OPHTHALMIC at 08:53

## 2024-11-23 RX ADMIN — POLYETHYLENE GLYCOL 3350 5 GRAMS: 17 POWDER, FOR SOLUTION ORAL at 08:52

## 2024-11-23 RX ADMIN — PREDNISONE 5 MG: 5 TABLET ORAL at 08:52

## 2024-11-23 RX ADMIN — KETOROLAC TROMETHAMINE 10 MG: 15 INJECTION, SOLUTION INTRAMUSCULAR; INTRAVENOUS at 08:54

## 2024-11-23 RX ADMIN — GABAPENTIN 400 MG: 400 CAPSULE ORAL at 08:52

## 2024-11-23 RX ADMIN — Medication 1000 MG: at 08:52

## 2024-12-12 VITALS — RESPIRATION RATE: 18 BRPM | SYSTOLIC BLOOD PRESSURE: 130 MMHG | DIASTOLIC BLOOD PRESSURE: 90 MMHG

## 2024-12-12 VITALS — RESPIRATION RATE: 17 BRPM | SYSTOLIC BLOOD PRESSURE: 138 MMHG | DIASTOLIC BLOOD PRESSURE: 104 MMHG

## 2024-12-12 VITALS — DIASTOLIC BLOOD PRESSURE: 81 MMHG | RESPIRATION RATE: 16 BRPM | SYSTOLIC BLOOD PRESSURE: 146 MMHG

## 2024-12-12 VITALS — RESPIRATION RATE: 11 BRPM

## 2024-12-12 VITALS — RESPIRATION RATE: 15 BRPM

## 2024-12-12 VITALS — RESPIRATION RATE: 16 BRPM | DIASTOLIC BLOOD PRESSURE: 80 MMHG | SYSTOLIC BLOOD PRESSURE: 151 MMHG

## 2024-12-12 VITALS — RESPIRATION RATE: 14 BRPM

## 2024-12-12 VITALS — RESPIRATION RATE: 18 BRPM | DIASTOLIC BLOOD PRESSURE: 96 MMHG | SYSTOLIC BLOOD PRESSURE: 152 MMHG

## 2024-12-12 VITALS — DIASTOLIC BLOOD PRESSURE: 90 MMHG | SYSTOLIC BLOOD PRESSURE: 130 MMHG

## 2024-12-12 VITALS — DIASTOLIC BLOOD PRESSURE: 89 MMHG | SYSTOLIC BLOOD PRESSURE: 160 MMHG

## 2024-12-12 LAB
ALBUMIN SERPL-MCNC: 3.8 G/DL (ref 3.5–5)
ALP SERPL-CCNC: 37 U/L (ref 38–126)
ALT SERPL-CCNC: 35 U/L (ref 0–35)
AST SERPL-CCNC: 38 U/L (ref 14–36)
BUN SERPL-MCNC: 12 MG/DL (ref 7–17)
CALCIUM SERPL-MCNC: 7.6 MG/DL (ref 8.4–10.2)
CHLORIDE SERPL-SCNC: 106 MMOL/L (ref 98–107)
CO2 SERPL-SCNC: 20 MMOL/L (ref 22–30)
EGFR: > 60
ERYTHROCYTE [DISTWIDTH] IN BLOOD BY AUTOMATED COUNT: 12.1 % (ref 11.5–14.5)
GLUCOSE SERPL-MCNC: 131 MG/DL (ref 70–99)
HCT VFR BLD AUTO: 42.6 % (ref 37–47)
HGB BLD-MCNC: 14 G/DL (ref 12–16)
MCHC RBC AUTO-ENTMCNC: 32.9 G/DL (ref 33–37)
MCV RBC AUTO: 106.2 FL (ref 81–99)
NRBC BLD AUTO-RTO: 0 %
PLATELET # BLD AUTO: 138 10^3/UL (ref 130–400)
POTASSIUM SERPL-SCNC: 3.7 MMOL/L (ref 3.5–5.1)
PROT SERPL-MCNC: 6.1 G/DL (ref 6.3–8.2)
SODIUM SERPL-SCNC: 137 MMOL/L (ref 135–145)

## 2024-12-12 RX ADMIN — POLYVINYL ALCOHOL, POVIDONE 2 DROPS: 14; 6 SOLUTION/ DROPS OPHTHALMIC at 18:10

## 2024-12-12 RX ADMIN — POLYETHYLENE GLYCOL 3350: 17 POWDER, FOR SOLUTION ORAL at 23:01

## 2024-12-12 RX ADMIN — RIVAROXABAN 20 MG: 20 TABLET, FILM COATED ORAL at 18:10

## 2024-12-12 RX ADMIN — BENZONATATE 100 MG: 100 CAPSULE ORAL at 22:54

## 2024-12-12 RX ADMIN — POLYVINYL ALCOHOL, POVIDONE: 14; 6 SOLUTION/ DROPS OPHTHALMIC at 23:09

## 2024-12-12 RX ADMIN — DEXAMETHASONE SODIUM PHOSPHATE 6 MG: 4 INJECTION, SOLUTION INTRA-ARTICULAR; INTRALESIONAL; INTRAMUSCULAR; INTRAVENOUS; SOFT TISSUE at 16:33

## 2024-12-12 RX ADMIN — ONDANSETRON HYDROCHLORIDE 4 MG: 2 SOLUTION INTRAMUSCULAR; INTRAVENOUS at 16:33

## 2024-12-12 RX ADMIN — GABAPENTIN 400 MG: 400 CAPSULE ORAL at 22:54

## 2024-12-12 RX ADMIN — BRINZOLAMIDE/BRIMONIDINE TARTRATE 1 DROP: 10; 2 SUSPENSION/ DROPS OPHTHALMIC at 22:54

## 2024-12-12 RX ADMIN — OXYCODONE HYDROCHLORIDE 10 MG: 10 TABLET ORAL at 22:55

## 2024-12-12 RX ADMIN — SODIUM CHLORIDE 1000: 900 INJECTION, SOLUTION INTRAVENOUS at 14:17

## 2024-12-12 RX ADMIN — GABAPENTIN 100 MG: 100 CAPSULE ORAL at 22:54

## 2024-12-12 RX ADMIN — POLYVINYL ALCOHOL, POVIDONE: 14; 6 SOLUTION/ DROPS OPHTHALMIC at 22:54

## 2024-12-12 RX ADMIN — IBUPROFEN 400 MG: 400 TABLET, FILM COATED ORAL at 18:09

## 2024-12-13 ENCOUNTER — HOSPITAL ENCOUNTER (INPATIENT)
Dept: HOSPITAL 99 - 2 NORTH | Age: 79
LOS: 2 days | Discharge: HOME HEALTH | DRG: 178 | End: 2024-12-15
Payer: MEDICARE

## 2024-12-13 VITALS — RESPIRATION RATE: 18 BRPM | DIASTOLIC BLOOD PRESSURE: 92 MMHG | SYSTOLIC BLOOD PRESSURE: 150 MMHG

## 2024-12-13 VITALS — RESPIRATION RATE: 18 BRPM | SYSTOLIC BLOOD PRESSURE: 107 MMHG | DIASTOLIC BLOOD PRESSURE: 69 MMHG

## 2024-12-13 VITALS — RESPIRATION RATE: 18 BRPM | DIASTOLIC BLOOD PRESSURE: 70 MMHG | SYSTOLIC BLOOD PRESSURE: 120 MMHG

## 2024-12-13 VITALS — OXYGEN SATURATION: 97 % | DIASTOLIC BLOOD PRESSURE: 92 MMHG | HEART RATE: 81 BPM | SYSTOLIC BLOOD PRESSURE: 126 MMHG

## 2024-12-13 VITALS — SYSTOLIC BLOOD PRESSURE: 165 MMHG | RESPIRATION RATE: 18 BRPM | DIASTOLIC BLOOD PRESSURE: 98 MMHG

## 2024-12-13 VITALS — BODY MASS INDEX: 36.1 KG/M2

## 2024-12-13 DIAGNOSIS — Z90.49: ICD-10-CM

## 2024-12-13 DIAGNOSIS — R53.1: ICD-10-CM
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
## 2024-12-13 DIAGNOSIS — Z88.0: ICD-10-CM

## 2024-12-13 DIAGNOSIS — R74.01: ICD-10-CM

## 2024-12-13 DIAGNOSIS — U07.1: Primary | ICD-10-CM

## 2024-12-13 DIAGNOSIS — Z91.018: ICD-10-CM

## 2024-12-13 DIAGNOSIS — Z87.891: ICD-10-CM

## 2024-12-13 DIAGNOSIS — J45.909: ICD-10-CM

## 2024-12-13 DIAGNOSIS — T38.0X5A: ICD-10-CM

## 2024-12-13 DIAGNOSIS — Z96.611: ICD-10-CM

## 2024-12-13 DIAGNOSIS — Z86.73: ICD-10-CM

## 2024-12-13 DIAGNOSIS — Z79.69: ICD-10-CM

## 2024-12-13 DIAGNOSIS — J38.00: ICD-10-CM

## 2024-12-13 DIAGNOSIS — M31.5: ICD-10-CM

## 2024-12-13 DIAGNOSIS — E78.00: ICD-10-CM

## 2024-12-13 DIAGNOSIS — Z79.52: ICD-10-CM

## 2024-12-13 DIAGNOSIS — G47.33: ICD-10-CM

## 2024-12-13 DIAGNOSIS — K59.00: ICD-10-CM

## 2024-12-13 DIAGNOSIS — R09.02: ICD-10-CM

## 2024-12-13 DIAGNOSIS — Z88.8: ICD-10-CM

## 2024-12-13 DIAGNOSIS — D75.89: ICD-10-CM

## 2024-12-13 DIAGNOSIS — I48.0: ICD-10-CM

## 2024-12-13 DIAGNOSIS — E27.3: ICD-10-CM

## 2024-12-13 DIAGNOSIS — G62.9: ICD-10-CM

## 2024-12-13 DIAGNOSIS — D72.819: ICD-10-CM

## 2024-12-13 DIAGNOSIS — Z79.01: ICD-10-CM

## 2024-12-13 DIAGNOSIS — Z88.1: ICD-10-CM

## 2024-12-13 DIAGNOSIS — R73.9: ICD-10-CM

## 2024-12-13 DIAGNOSIS — I10: ICD-10-CM

## 2024-12-13 DIAGNOSIS — H40.9: ICD-10-CM

## 2024-12-13 DIAGNOSIS — K21.9: ICD-10-CM

## 2024-12-13 LAB
ALBUMIN SERPL-MCNC: 4.2 G/DL (ref 3.5–5)
ALP SERPL-CCNC: 43 U/L (ref 38–126)
ALT SERPL-CCNC: 35 U/L (ref 0–35)
AST SERPL-CCNC: 39 U/L (ref 14–36)
BUN SERPL-MCNC: 13 MG/DL (ref 7–17)
CALCIUM SERPL-MCNC: 8.1 MG/DL (ref 8.4–10.2)
CHLORIDE SERPL-SCNC: 105 MMOL/L (ref 98–107)
CO2 SERPL-SCNC: 25 MMOL/L (ref 22–30)
EGFR: > 60
ERYTHROCYTE [DISTWIDTH] IN BLOOD BY AUTOMATED COUNT: 11.9 % (ref 11.5–14.5)
ESTIMATED CREATININE CLEARANCE: 82 ML/MIN
GLUCOSE - POINT OF CARE: 113 MG/DL (ref 70–99)
GLUCOSE - POINT OF CARE: 125 MG/DL (ref 70–99)
GLUCOSE - POINT OF CARE: 168 MG/DL (ref 70–99)
GLUCOSE SERPL-MCNC: 130 MG/DL (ref 70–99)
HCT VFR BLD AUTO: 42.6 % (ref 37–47)
HGB BLD-MCNC: 14 G/DL (ref 12–16)
LIPASE SERPL-CCNC: 94 U/L (ref 23–300)
MCHC RBC AUTO-ENTMCNC: 32.9 G/DL (ref 33–37)
MCV RBC AUTO: 103.4 FL (ref 81–99)
NRBC BLD AUTO-RTO: 0 %
PLATELET # BLD AUTO: 163 10^3/UL (ref 130–400)
POTASSIUM SERPL-SCNC: 4.1 MMOL/L (ref 3.5–5.1)
PROCALCITONIN SERPL-MCNC: 0.11 NG/ML (ref 0–0.25)
PROT SERPL-MCNC: 6.6 G/DL (ref 6.3–8.2)
SODIUM SERPL-SCNC: 141 MMOL/L (ref 135–145)

## 2024-12-13 RX ADMIN — GUAIFENESIN 600 MG: 600 TABLET, EXTENDED RELEASE ORAL at 11:17

## 2024-12-13 RX ADMIN — POLYETHYLENE GLYCOL 3350 5 GRAMS: 17 POWDER, FOR SOLUTION ORAL at 09:43

## 2024-12-13 RX ADMIN — POLYETHYLENE GLYCOL 3350: 17 POWDER, FOR SOLUTION ORAL at 17:02

## 2024-12-13 RX ADMIN — RIVAROXABAN 20 MG: 20 TABLET, FILM COATED ORAL at 17:25

## 2024-12-13 RX ADMIN — BRINZOLAMIDE/BRIMONIDINE TARTRATE 1 DROP: 10; 2 SUSPENSION/ DROPS OPHTHALMIC at 17:25

## 2024-12-13 RX ADMIN — OXYCODONE HYDROCHLORIDE 10 MG: 10 TABLET ORAL at 20:35

## 2024-12-13 RX ADMIN — BENZONATATE 100 MG: 100 CAPSULE ORAL at 20:33

## 2024-12-13 RX ADMIN — IBUPROFEN 400 MG: 400 TABLET, FILM COATED ORAL at 20:33

## 2024-12-13 RX ADMIN — BENZONATATE 100 MG: 100 CAPSULE ORAL at 09:43

## 2024-12-13 RX ADMIN — PANTOPRAZOLE SODIUM 40 MG: 40 TABLET, DELAYED RELEASE ORAL at 09:44

## 2024-12-13 RX ADMIN — DEXAMETHASONE SODIUM PHOSPHATE 6 MG: 4 INJECTION, SOLUTION INTRA-ARTICULAR; INTRALESIONAL; INTRAMUSCULAR; INTRAVENOUS; SOFT TISSUE at 17:24

## 2024-12-13 RX ADMIN — GABAPENTIN 400 MG: 400 CAPSULE ORAL at 20:35

## 2024-12-13 RX ADMIN — POLYETHYLENE GLYCOL 3350 5 GRAMS: 17 POWDER, FOR SOLUTION ORAL at 20:20

## 2024-12-13 RX ADMIN — BRINZOLAMIDE/BRIMONIDINE TARTRATE 1 DROP: 10; 2 SUSPENSION/ DROPS OPHTHALMIC at 09:43

## 2024-12-13 RX ADMIN — Medication 1000 MG: at 09:42

## 2024-12-13 RX ADMIN — POLYVINYL ALCOHOL, POVIDONE 2 DROPS: 14; 6 SOLUTION/ DROPS OPHTHALMIC at 11:17

## 2024-12-13 RX ADMIN — METOPROLOL SUCCINATE 25 MG: 25 TABLET, FILM COATED, EXTENDED RELEASE ORAL at 09:41

## 2024-12-13 RX ADMIN — POLYVINYL ALCOHOL, POVIDONE 2 DROPS: 14; 6 SOLUTION/ DROPS OPHTHALMIC at 17:24

## 2024-12-13 RX ADMIN — MOLNUPIRAVIR 800 MG: 200 CAPSULE ORAL at 20:19

## 2024-12-13 RX ADMIN — INSULIN ASPART: 100 INJECTION, SOLUTION INTRAVENOUS; SUBCUTANEOUS at 17:03

## 2024-12-13 RX ADMIN — INSULIN ASPART: 100 INJECTION, SOLUTION INTRAVENOUS; SUBCUTANEOUS at 12:08

## 2024-12-13 RX ADMIN — OXYCODONE HYDROCHLORIDE 10 MG: 10 TABLET ORAL at 17:24

## 2024-12-13 RX ADMIN — GABAPENTIN 100 MG: 100 CAPSULE ORAL at 20:35

## 2024-12-13 RX ADMIN — POLYVINYL ALCOHOL, POVIDONE 2 DROPS: 14; 6 SOLUTION/ DROPS OPHTHALMIC at 09:42

## 2024-12-13 RX ADMIN — BRINZOLAMIDE/BRIMONIDINE TARTRATE 1 DROP: 10; 2 SUSPENSION/ DROPS OPHTHALMIC at 20:34

## 2024-12-13 RX ADMIN — POLYVINYL ALCOHOL, POVIDONE 2 DROPS: 14; 6 SOLUTION/ DROPS OPHTHALMIC at 20:34

## 2024-12-13 RX ADMIN — MOLNUPIRAVIR 800 MG: 200 CAPSULE ORAL at 11:17

## 2024-12-13 RX ADMIN — POLYVINYL ALCOHOL, POVIDONE: 14; 6 SOLUTION/ DROPS OPHTHALMIC at 14:21

## 2024-12-13 RX ADMIN — Medication 400 UNITS: at 09:44

## 2024-12-13 RX ADMIN — OXYCODONE HYDROCHLORIDE 10 MG: 10 TABLET ORAL at 09:42

## 2024-12-13 RX ADMIN — GUAIFENESIN 600 MG: 600 TABLET, EXTENDED RELEASE ORAL at 20:19

## 2024-12-13 RX ADMIN — Medication 50 MG: at 09:41

## 2024-12-14 VITALS — SYSTOLIC BLOOD PRESSURE: 129 MMHG | RESPIRATION RATE: 18 BRPM | DIASTOLIC BLOOD PRESSURE: 80 MMHG

## 2024-12-14 VITALS — SYSTOLIC BLOOD PRESSURE: 154 MMHG | RESPIRATION RATE: 18 BRPM | DIASTOLIC BLOOD PRESSURE: 83 MMHG

## 2024-12-14 VITALS — RESPIRATION RATE: 18 BRPM | SYSTOLIC BLOOD PRESSURE: 134 MMHG | DIASTOLIC BLOOD PRESSURE: 71 MMHG

## 2024-12-14 VITALS — OXYGEN SATURATION: 97 % | DIASTOLIC BLOOD PRESSURE: 65 MMHG | HEART RATE: 79 BPM | SYSTOLIC BLOOD PRESSURE: 98 MMHG

## 2024-12-14 LAB
ALBUMIN SERPL-MCNC: 3.8 G/DL (ref 3.5–5)
ALP SERPL-CCNC: 44 U/L (ref 38–126)
ALT SERPL-CCNC: 34 U/L (ref 0–35)
AST SERPL-CCNC: 35 U/L (ref 14–36)
BUN SERPL-MCNC: 20 MG/DL (ref 7–17)
CALCIUM SERPL-MCNC: 8.1 MG/DL (ref 8.4–10.2)
CHLORIDE SERPL-SCNC: 104 MMOL/L (ref 98–107)
CO2 SERPL-SCNC: 25 MMOL/L (ref 22–30)
EGFR: > 60
ERYTHROCYTE [DISTWIDTH] IN BLOOD BY AUTOMATED COUNT: 12 % (ref 11.5–14.5)
ESTIMATED CREATININE CLEARANCE: 70 ML/MIN
FOLATE SERPL-MCNC: > 20 NG/ML (ref 2.76–20)
GLUCOSE - POINT OF CARE: 135 MG/DL (ref 70–99)
GLUCOSE - POINT OF CARE: 158 MG/DL (ref 70–99)
GLUCOSE - POINT OF CARE: 184 MG/DL (ref 70–99)
GLUCOSE - POINT OF CARE: 192 MG/DL (ref 70–99)
GLUCOSE SERPL-MCNC: 139 MG/DL (ref 70–99)
HCT VFR BLD AUTO: 40.6 % (ref 37–47)
HGB BLD-MCNC: 13.2 G/DL (ref 12–16)
MCHC RBC AUTO-ENTMCNC: 32.5 G/DL (ref 33–37)
MCV RBC AUTO: 106 FL (ref 81–99)
NRBC BLD AUTO-RTO: 0 %
PLATELET # BLD AUTO: 157 10^3/UL (ref 130–400)
POTASSIUM SERPL-SCNC: 4.8 MMOL/L (ref 3.5–5.1)
PROT SERPL-MCNC: 6 G/DL (ref 6.3–8.2)
SODIUM SERPL-SCNC: 137 MMOL/L (ref 135–145)
VIT B12 SERPL-MCNC: > 1000 PG/ML (ref 239–931)

## 2024-12-14 RX ADMIN — GUAIFENESIN 600 MG: 600 TABLET, EXTENDED RELEASE ORAL at 19:58

## 2024-12-14 RX ADMIN — OXYCODONE HYDROCHLORIDE 10 MG: 10 TABLET ORAL at 08:40

## 2024-12-14 RX ADMIN — Medication 1000 MG: at 08:39

## 2024-12-14 RX ADMIN — BRINZOLAMIDE/BRIMONIDINE TARTRATE 1 DROP: 10; 2 SUSPENSION/ DROPS OPHTHALMIC at 21:48

## 2024-12-14 RX ADMIN — GABAPENTIN 100 MG: 100 CAPSULE ORAL at 21:45

## 2024-12-14 RX ADMIN — POLYVINYL ALCOHOL, POVIDONE 2 DROPS: 14; 6 SOLUTION/ DROPS OPHTHALMIC at 08:40

## 2024-12-14 RX ADMIN — POLYETHYLENE GLYCOL 3350 5 GRAMS: 17 POWDER, FOR SOLUTION ORAL at 17:50

## 2024-12-14 RX ADMIN — POLYVINYL ALCOHOL, POVIDONE 2 DROPS: 14; 6 SOLUTION/ DROPS OPHTHALMIC at 21:47

## 2024-12-14 RX ADMIN — GUAIFENESIN 600 MG: 600 TABLET, EXTENDED RELEASE ORAL at 08:40

## 2024-12-14 RX ADMIN — OXYCODONE HYDROCHLORIDE 10 MG: 10 TABLET ORAL at 17:50

## 2024-12-14 RX ADMIN — RIVAROXABAN 20 MG: 20 TABLET, FILM COATED ORAL at 17:51

## 2024-12-14 RX ADMIN — OXYCODONE HYDROCHLORIDE 10 MG: 10 TABLET ORAL at 21:45

## 2024-12-14 RX ADMIN — Medication 50 MG: at 08:39

## 2024-12-14 RX ADMIN — INSULIN ASPART: 100 INJECTION, SOLUTION INTRAVENOUS; SUBCUTANEOUS at 08:32

## 2024-12-14 RX ADMIN — INSULIN ASPART 1 UNITS: 100 INJECTION, SOLUTION INTRAVENOUS; SUBCUTANEOUS at 13:10

## 2024-12-14 RX ADMIN — BRINZOLAMIDE/BRIMONIDINE TARTRATE 1 DROP: 10; 2 SUSPENSION/ DROPS OPHTHALMIC at 08:41

## 2024-12-14 RX ADMIN — MOLNUPIRAVIR 800 MG: 200 CAPSULE ORAL at 19:58

## 2024-12-14 RX ADMIN — DEXAMETHASONE 6 MG: 4 TABLET ORAL at 10:46

## 2024-12-14 RX ADMIN — METOPROLOL SUCCINATE 25 MG: 25 TABLET, FILM COATED, EXTENDED RELEASE ORAL at 08:40

## 2024-12-14 RX ADMIN — Medication 400 UNITS: at 08:39

## 2024-12-14 RX ADMIN — BRINZOLAMIDE/BRIMONIDINE TARTRATE 1 DROP: 10; 2 SUSPENSION/ DROPS OPHTHALMIC at 17:51

## 2024-12-14 RX ADMIN — GABAPENTIN 400 MG: 400 CAPSULE ORAL at 21:45

## 2024-12-14 RX ADMIN — POLYVINYL ALCOHOL, POVIDONE 2 DROPS: 14; 6 SOLUTION/ DROPS OPHTHALMIC at 17:51

## 2024-12-14 RX ADMIN — PANTOPRAZOLE SODIUM 40 MG: 40 TABLET, DELAYED RELEASE ORAL at 08:39

## 2024-12-14 RX ADMIN — POLYVINYL ALCOHOL, POVIDONE: 14; 6 SOLUTION/ DROPS OPHTHALMIC at 15:08

## 2024-12-14 RX ADMIN — AZITHROMYCIN 500 MG: 250 TABLET, FILM COATED ORAL at 10:47

## 2024-12-14 RX ADMIN — POLYETHYLENE GLYCOL 3350 5 GRAMS: 17 POWDER, FOR SOLUTION ORAL at 08:38

## 2024-12-14 RX ADMIN — MOLNUPIRAVIR 800 MG: 200 CAPSULE ORAL at 08:39

## 2024-12-14 RX ADMIN — POLYETHYLENE GLYCOL 3350: 17 POWDER, FOR SOLUTION ORAL at 23:52

## 2024-12-14 RX ADMIN — INSULIN ASPART 1 UNITS: 100 INJECTION, SOLUTION INTRAVENOUS; SUBCUTANEOUS at 17:51

## 2024-12-14 RX ADMIN — POLYVINYL ALCOHOL, POVIDONE 2 DROPS: 14; 6 SOLUTION/ DROPS OPHTHALMIC at 10:46

## 2024-12-15 VITALS — SYSTOLIC BLOOD PRESSURE: 134 MMHG | DIASTOLIC BLOOD PRESSURE: 80 MMHG | RESPIRATION RATE: 16 BRPM

## 2024-12-15 VITALS — SYSTOLIC BLOOD PRESSURE: 136 MMHG | DIASTOLIC BLOOD PRESSURE: 82 MMHG | RESPIRATION RATE: 18 BRPM

## 2024-12-15 LAB
GLUCOSE - POINT OF CARE: 135 MG/DL (ref 70–99)
GLUCOSE - POINT OF CARE: 140 MG/DL (ref 70–99)

## 2024-12-15 RX ADMIN — MOLNUPIRAVIR 800 MG: 200 CAPSULE ORAL at 08:27

## 2024-12-15 RX ADMIN — PANTOPRAZOLE SODIUM 40 MG: 40 TABLET, DELAYED RELEASE ORAL at 08:27

## 2024-12-15 RX ADMIN — GUAIFENESIN 600 MG: 600 TABLET, EXTENDED RELEASE ORAL at 08:27

## 2024-12-15 RX ADMIN — INSULIN ASPART: 100 INJECTION, SOLUTION INTRAVENOUS; SUBCUTANEOUS at 08:27

## 2024-12-15 RX ADMIN — Medication 50 MG: at 08:27

## 2024-12-15 RX ADMIN — OXYCODONE HYDROCHLORIDE 10 MG: 10 TABLET ORAL at 08:27

## 2024-12-15 RX ADMIN — INSULIN ASPART: 100 INJECTION, SOLUTION INTRAVENOUS; SUBCUTANEOUS at 11:40

## 2024-12-15 RX ADMIN — POLYVINYL ALCOHOL, POVIDONE 2 DROPS: 14; 6 SOLUTION/ DROPS OPHTHALMIC at 11:40

## 2024-12-15 RX ADMIN — POLYVINYL ALCOHOL, POVIDONE 2 DROPS: 14; 6 SOLUTION/ DROPS OPHTHALMIC at 08:27

## 2024-12-15 RX ADMIN — Medication 400 UNITS: at 08:28

## 2024-12-15 RX ADMIN — BRINZOLAMIDE/BRIMONIDINE TARTRATE 1 DROP: 10; 2 SUSPENSION/ DROPS OPHTHALMIC at 08:28

## 2024-12-15 RX ADMIN — DEXAMETHASONE 6 MG: 4 TABLET ORAL at 08:27

## 2024-12-15 RX ADMIN — Medication 1000 MG: at 08:28

## 2024-12-15 RX ADMIN — POLYETHYLENE GLYCOL 3350 5 GRAMS: 17 POWDER, FOR SOLUTION ORAL at 08:28

## 2024-12-15 RX ADMIN — METOPROLOL SUCCINATE 25 MG: 25 TABLET, FILM COATED, EXTENDED RELEASE ORAL at 08:28

## 2025-01-08 ENCOUNTER — HOSPITAL ENCOUNTER (OUTPATIENT)
Dept: HOSPITAL 99 - RAD | Age: 80
End: 2025-01-08
Payer: MEDICARE

## 2025-01-08 DIAGNOSIS — M79.661: Primary | ICD-10-CM

## 2025-03-25 ENCOUNTER — HOSPITAL ENCOUNTER (EMERGENCY)
Dept: HOSPITAL 99 - EMR | Age: 80
Discharge: HOME | End: 2025-03-25
Payer: MEDICARE

## 2025-03-25 VITALS — DIASTOLIC BLOOD PRESSURE: 71 MMHG | SYSTOLIC BLOOD PRESSURE: 116 MMHG | RESPIRATION RATE: 18 BRPM

## 2025-03-25 VITALS — RESPIRATION RATE: 18 BRPM

## 2025-03-25 VITALS — RESPIRATION RATE: 15 BRPM

## 2025-03-25 VITALS — RESPIRATION RATE: 12 BRPM

## 2025-03-25 VITALS — RESPIRATION RATE: 16 BRPM

## 2025-03-25 VITALS — DIASTOLIC BLOOD PRESSURE: 98 MMHG | SYSTOLIC BLOOD PRESSURE: 128 MMHG | RESPIRATION RATE: 16 BRPM

## 2025-03-25 VITALS — DIASTOLIC BLOOD PRESSURE: 73 MMHG | SYSTOLIC BLOOD PRESSURE: 97 MMHG | RESPIRATION RATE: 18 BRPM

## 2025-03-25 VITALS — DIASTOLIC BLOOD PRESSURE: 77 MMHG | SYSTOLIC BLOOD PRESSURE: 132 MMHG | RESPIRATION RATE: 24 BRPM

## 2025-03-25 VITALS — RESPIRATION RATE: 19 BRPM

## 2025-03-25 VITALS — RESPIRATION RATE: 30 BRPM

## 2025-03-25 VITALS — BODY MASS INDEX: 37.2 KG/M2

## 2025-03-25 DIAGNOSIS — Z87.891: ICD-10-CM

## 2025-03-25 DIAGNOSIS — I47.10: Primary | ICD-10-CM

## 2025-03-25 DIAGNOSIS — I10: ICD-10-CM

## 2025-03-25 LAB
ALBUMIN SERPL-MCNC: 4.5 G/DL (ref 3.5–5)
ALP SERPL-CCNC: 69 U/L (ref 38–126)
ALT SERPL-CCNC: 83 U/L (ref 0–35)
AST SERPL-CCNC: 77 U/L (ref 14–36)
BUN SERPL-MCNC: 23 MG/DL (ref 7–17)
CALCIUM SERPL-MCNC: 9.8 MG/DL (ref 8.4–10.2)
CHLORIDE SERPL-SCNC: 105 MMOL/L (ref 98–107)
CO2 SERPL-SCNC: 27 MMOL/L (ref 22–30)
EGFR: 51.11
ERYTHROCYTE [DISTWIDTH] IN BLOOD BY AUTOMATED COUNT: 14.9 % (ref 11.5–14.5)
ESTIMATED CREATININE CLEARANCE: 46 ML/MIN
GLUCOSE SERPL-MCNC: 143 MG/DL (ref 70–99)
HCT VFR BLD AUTO: 44 % (ref 37–47)
HGB BLD-MCNC: 14.7 G/DL (ref 12–16)
MAGNESIUM SERPL-MCNC: 1.9 MG/DL (ref 1.6–2.3)
MCHC RBC AUTO-ENTMCNC: 33.4 G/DL (ref 33–37)
MCV RBC AUTO: 100.2 FL (ref 81–99)
NRBC BLD AUTO-RTO: 0 %
PLATELET # BLD AUTO: 178 10^3/UL (ref 130–400)
POTASSIUM SERPL-SCNC: 4.9 MMOL/L (ref 3.5–5.1)
PROT SERPL-MCNC: 7 G/DL (ref 6.3–8.2)
SODIUM SERPL-SCNC: 140 MMOL/L (ref 135–145)

## 2025-03-25 PROCEDURE — 96361 HYDRATE IV INFUSION ADD-ON: CPT

## 2025-03-25 PROCEDURE — 99284 EMERGENCY DEPT VISIT MOD MDM: CPT

## 2025-03-25 PROCEDURE — 96374 THER/PROPH/DIAG INJ IV PUSH: CPT

## 2025-03-25 PROCEDURE — 96375 TX/PRO/DX INJ NEW DRUG ADDON: CPT

## 2025-03-25 RX ADMIN — SODIUM CHLORIDE 500: 900 INJECTION, SOLUTION INTRAVENOUS at 15:53

## 2025-03-25 RX ADMIN — METOPROLOL TARTRATE 2.5 MG: 1 INJECTION, SOLUTION INTRAVENOUS at 15:57

## 2025-03-25 RX ADMIN — ADENOSINE 6 MG: 3 INJECTION INTRAVENOUS at 15:55

## 2025-06-10 ENCOUNTER — HOSPITAL ENCOUNTER (INPATIENT)
Dept: HOSPITAL 99 - IMU | Age: 80
LOS: 2 days | Discharge: HOME HEALTH | DRG: 391 | End: 2025-06-12
Payer: MEDICARE

## 2025-06-10 VITALS — SYSTOLIC BLOOD PRESSURE: 108 MMHG | DIASTOLIC BLOOD PRESSURE: 52 MMHG

## 2025-06-10 VITALS — DIASTOLIC BLOOD PRESSURE: 48 MMHG | SYSTOLIC BLOOD PRESSURE: 83 MMHG

## 2025-06-10 VITALS — DIASTOLIC BLOOD PRESSURE: 59 MMHG | SYSTOLIC BLOOD PRESSURE: 105 MMHG

## 2025-06-10 VITALS — SYSTOLIC BLOOD PRESSURE: 80 MMHG | DIASTOLIC BLOOD PRESSURE: 48 MMHG

## 2025-06-10 VITALS — SYSTOLIC BLOOD PRESSURE: 96 MMHG | DIASTOLIC BLOOD PRESSURE: 58 MMHG

## 2025-06-10 VITALS — DIASTOLIC BLOOD PRESSURE: 59 MMHG | SYSTOLIC BLOOD PRESSURE: 113 MMHG

## 2025-06-10 VITALS — SYSTOLIC BLOOD PRESSURE: 98 MMHG | DIASTOLIC BLOOD PRESSURE: 63 MMHG

## 2025-06-10 VITALS — DIASTOLIC BLOOD PRESSURE: 60 MMHG | SYSTOLIC BLOOD PRESSURE: 111 MMHG

## 2025-06-10 VITALS — DIASTOLIC BLOOD PRESSURE: 62 MMHG | SYSTOLIC BLOOD PRESSURE: 119 MMHG

## 2025-06-10 VITALS — SYSTOLIC BLOOD PRESSURE: 74 MMHG | DIASTOLIC BLOOD PRESSURE: 49 MMHG

## 2025-06-10 VITALS — DIASTOLIC BLOOD PRESSURE: 60 MMHG | SYSTOLIC BLOOD PRESSURE: 107 MMHG

## 2025-06-10 VITALS — DIASTOLIC BLOOD PRESSURE: 50 MMHG | SYSTOLIC BLOOD PRESSURE: 91 MMHG

## 2025-06-10 VITALS — DIASTOLIC BLOOD PRESSURE: 64 MMHG | SYSTOLIC BLOOD PRESSURE: 117 MMHG

## 2025-06-10 VITALS — SYSTOLIC BLOOD PRESSURE: 115 MMHG | DIASTOLIC BLOOD PRESSURE: 59 MMHG

## 2025-06-10 VITALS — SYSTOLIC BLOOD PRESSURE: 132 MMHG | DIASTOLIC BLOOD PRESSURE: 68 MMHG

## 2025-06-10 VITALS — SYSTOLIC BLOOD PRESSURE: 111 MMHG | DIASTOLIC BLOOD PRESSURE: 65 MMHG

## 2025-06-10 VITALS — DIASTOLIC BLOOD PRESSURE: 66 MMHG | SYSTOLIC BLOOD PRESSURE: 102 MMHG

## 2025-06-10 VITALS — SYSTOLIC BLOOD PRESSURE: 109 MMHG | DIASTOLIC BLOOD PRESSURE: 61 MMHG

## 2025-06-10 VITALS — DIASTOLIC BLOOD PRESSURE: 43 MMHG | SYSTOLIC BLOOD PRESSURE: 95 MMHG

## 2025-06-10 VITALS — SYSTOLIC BLOOD PRESSURE: 112 MMHG | DIASTOLIC BLOOD PRESSURE: 59 MMHG

## 2025-06-10 VITALS — DIASTOLIC BLOOD PRESSURE: 60 MMHG | SYSTOLIC BLOOD PRESSURE: 114 MMHG

## 2025-06-10 VITALS — SYSTOLIC BLOOD PRESSURE: 94 MMHG | DIASTOLIC BLOOD PRESSURE: 48 MMHG

## 2025-06-10 VITALS — SYSTOLIC BLOOD PRESSURE: 86 MMHG | DIASTOLIC BLOOD PRESSURE: 46 MMHG

## 2025-06-10 VITALS — SYSTOLIC BLOOD PRESSURE: 105 MMHG | DIASTOLIC BLOOD PRESSURE: 66 MMHG

## 2025-06-10 VITALS — SYSTOLIC BLOOD PRESSURE: 112 MMHG | DIASTOLIC BLOOD PRESSURE: 61 MMHG

## 2025-06-10 VITALS — DIASTOLIC BLOOD PRESSURE: 45 MMHG | SYSTOLIC BLOOD PRESSURE: 78 MMHG

## 2025-06-10 VITALS — DIASTOLIC BLOOD PRESSURE: 48 MMHG | SYSTOLIC BLOOD PRESSURE: 97 MMHG

## 2025-06-10 VITALS — DIASTOLIC BLOOD PRESSURE: 56 MMHG | SYSTOLIC BLOOD PRESSURE: 114 MMHG

## 2025-06-10 VITALS — SYSTOLIC BLOOD PRESSURE: 97 MMHG | DIASTOLIC BLOOD PRESSURE: 63 MMHG

## 2025-06-10 VITALS — SYSTOLIC BLOOD PRESSURE: 97 MMHG | DIASTOLIC BLOOD PRESSURE: 66 MMHG

## 2025-06-10 VITALS — SYSTOLIC BLOOD PRESSURE: 118 MMHG | DIASTOLIC BLOOD PRESSURE: 54 MMHG

## 2025-06-10 VITALS — SYSTOLIC BLOOD PRESSURE: 109 MMHG | DIASTOLIC BLOOD PRESSURE: 46 MMHG

## 2025-06-10 VITALS — DIASTOLIC BLOOD PRESSURE: 62 MMHG | SYSTOLIC BLOOD PRESSURE: 106 MMHG

## 2025-06-10 VITALS — SYSTOLIC BLOOD PRESSURE: 112 MMHG | DIASTOLIC BLOOD PRESSURE: 57 MMHG

## 2025-06-10 VITALS — SYSTOLIC BLOOD PRESSURE: 99 MMHG | DIASTOLIC BLOOD PRESSURE: 59 MMHG

## 2025-06-10 VITALS — SYSTOLIC BLOOD PRESSURE: 90 MMHG | DIASTOLIC BLOOD PRESSURE: 57 MMHG

## 2025-06-10 VITALS — DIASTOLIC BLOOD PRESSURE: 60 MMHG

## 2025-06-10 VITALS — SYSTOLIC BLOOD PRESSURE: 111 MMHG | DIASTOLIC BLOOD PRESSURE: 70 MMHG

## 2025-06-10 VITALS — DIASTOLIC BLOOD PRESSURE: 70 MMHG | SYSTOLIC BLOOD PRESSURE: 119 MMHG

## 2025-06-10 VITALS — SYSTOLIC BLOOD PRESSURE: 111 MMHG | DIASTOLIC BLOOD PRESSURE: 67 MMHG

## 2025-06-10 VITALS — DIASTOLIC BLOOD PRESSURE: 76 MMHG | SYSTOLIC BLOOD PRESSURE: 114 MMHG

## 2025-06-10 DIAGNOSIS — Z79.01: ICD-10-CM

## 2025-06-10 DIAGNOSIS — K52.9: Primary | ICD-10-CM

## 2025-06-10 DIAGNOSIS — Z87.891: ICD-10-CM

## 2025-06-10 DIAGNOSIS — G47.33: ICD-10-CM

## 2025-06-10 DIAGNOSIS — I48.0: ICD-10-CM

## 2025-06-10 DIAGNOSIS — Z79.52: ICD-10-CM

## 2025-06-10 DIAGNOSIS — R57.1: ICD-10-CM

## 2025-06-10 DIAGNOSIS — Z86.73: ICD-10-CM

## 2025-06-10 LAB
ABSOLUTE NEUTROPHILS -MAN DIFF: 2.7 10^3/UL (ref 1.4–6.5)
ACANTHOCYTES: (no result)
ALBUMIN SERPL-MCNC: 5.3 G/DL (ref 3.5–5)
ALP SERPL-CCNC: 50 U/L (ref 38–126)
ALT SERPL-CCNC: 36 U/L (ref 0–35)
AMORPH SED URNS QL MICRO: (no result)
ANISOCYTOSIS BLD QL: (no result)
APPEARANCE UR: CLEAR
AST SERPL-CCNC: 32 U/L (ref 14–36)
AUER BODIES BLD QL SMEAR: (no result)
BASO STIPL BLD QL SMEAR: (no result)
BASOPHILS # BLD AUTO: (no result) 10^3/UL (ref 0–0.2)
BASOPHILS # BLD MANUAL: (no result) %
BASOPHILS NFR BLD AUTO: (no result) % (ref 0–2)
BILIRUB SERPL-MCNC: 0.6 MG/DL (ref 0.2–1.3)
BILIRUB UR QL STRIP.AUTO: NEGATIVE
BLASTS NFR BLD MANUAL: (no result) %
BUN SERPL-MCNC: 31 MG/DL (ref 7–17)
BUN SERPL-MCNC: 33 MG/DL (ref 7–17)
BURR CELLS BLD QL SMEAR: (no result)
CALCIUM SERPL-MCNC: 10 MG/DL (ref 8.4–10.2)
CALCIUM SERPL-MCNC: 8.7 MG/DL (ref 8.4–10.2)
CAOX CRY URNS QL MICRO: (no result)
CHLORIDE SERPL-SCNC: 107 MMOL/L (ref 98–107)
CHLORIDE SERPL-SCNC: 110 MMOL/L (ref 98–107)
CO2 SERPL-SCNC: 23 MMOL/L (ref 22–30)
CO2 SERPL-SCNC: 25 MMOL/L (ref 22–30)
COLOR UR: YELLOW
COMMENT: (no result)
COMMENT: (no result)
CREAT SERPL-MCNC: 0.9 MG/DL (ref 0.6–1)
CREAT SERPL-MCNC: 1.1 MG/DL (ref 0.6–1)
CYSTINE CRY URNS QL MICRO: (no result)
DACRYOCYTES BLD QL SMEAR: (no result)
DOHLE BOD BLD QL SMEAR: (no result)
EGFR: 51.11
EGFR: > 60
EOSINOPHIL # BLD AUTO: (no result) 10^3/UL (ref 0–0.7)
EOSINOPHIL NFR BLD AUTO: (no result) % (ref 0–6)
EOSINOPHILS: (no result) % (ref 0–6)
ERYTHROCYTE [DISTWIDTH] IN BLOOD BY AUTOMATED COUNT: 13.7 % (ref 11.5–14.5)
ERYTHROCYTE [DISTWIDTH] IN BLOOD BY AUTOMATED COUNT: 13.7 % (ref 11.5–14.5)
ESTIMATED CREATININE CLEARANCE: 45 ML/MIN
ESTIMATED CREATININE CLEARANCE: 55 ML/MIN
GLUCOSE SERPL-MCNC: 113 MG/DL (ref 70–99)
GLUCOSE SERPL-MCNC: 126 MG/DL (ref 70–99)
GLUCOSE UR QL STRIP.AUTO: NEGATIVE
GRAN CASTS URNS QL MICRO: (no result) /LPF
HCT VFR BLD AUTO: 39.4 % (ref 37–47)
HCT VFR BLD AUTO: 48.1 % (ref 37–47)
HGB BLD-MCNC: 13.2 G/DL (ref 12–16)
HGB BLD-MCNC: 16.1 G/DL (ref 12–16)
HGB UR QL: NEGATIVE
HOWELL-JOLLY BOD BLD QL SMEAR: (no result)
HYPOCHROMIA BLD QL: (no result)
IMM GRANULOCYTES # BLD AUTO: (no result) 10^3/UL (ref 0–0.05)
IMM GRANULOCYTES NFR BLD AUTO: (no result) % (ref 0–0.5)
LACTATE SERPL-MCNC: 1.7 MMOL/L (ref 0.7–2)
LACTATE SERPL-MCNC: 1.8 MMOL/L (ref 0.7–2)
LEUKOCYTE ESTERASE UR QL STRIP.AUTO: NEGATIVE
LIPASE SERPL-CCNC: 310 U/L (ref 23–300)
LYMPHOCYTES # BLD AUTO: (no result) 10^3/UL (ref 1.2–3.4)
LYMPHOCYTES # BLD MANUAL: 5 % (ref 20–51)
LYMPHOCYTES NFR BLD AUTO: (no result) % (ref 20.5–51.1)
Lab: (no result)
Lab: (no result) %
Lab: (no result) /LPF
Lab: (no result) /LPF
MACROCYTES BLD QL: (no result)
MCH RBC QN AUTO: 33.3 PG (ref 27–31)
MCH RBC QN AUTO: 33.5 PG (ref 27–31)
MCHC RBC AUTO-ENTMCNC: 33.5 G/DL (ref 33–37)
MCHC RBC AUTO-ENTMCNC: 33.5 G/DL (ref 33–37)
MCV RBC AUTO: 100 FL (ref 81–99)
MCV RBC AUTO: 99.5 FL (ref 81–99)
METAMYELOCYTES: (no result) %
MICROCYTOSIS: (no result)
MONOCYTES # BLD AUTO: (no result) 10^3/UL (ref 0.1–0.6)
MONOCYTES # BLD MANUAL: 1 % (ref 2–9)
MONOCYTES NFR BLD AUTO: (no result) % (ref 1.7–9.3)
MUCOUS THREADS URNS QL MICRO: (no result)
MYELOCYTES NFR BLD MANUAL: (no result) %
NEUTROPHILS # BLD AUTO: (no result) 10^3/UL (ref 1.4–6.5)
NEUTROPHILS NFR BLD AUTO: (no result) % (ref 42.2–75.2)
NEUTS BAND NFR BLD MANUAL: 35 % (ref 0–3)
NEUTS HYPERSEG # BLD: (no result) 10*3/UL
NITRITE UR QL STRIP.AUTO: NEGATIVE
NRBC BLD AUTO-RTO: (no result) %
NUCLEATED RED BLOOD CELLS: (no result)
OTHER CELLS NFR BLD MANUAL: (no result) %
OVALOCYTES BLD QL SMEAR: (no result)
PELGER HUET CELLS BLD QL SMEAR: (no result)
PH UR: 5 [PH] (ref 5–9)
PLASMA CELLS NFR BLD: (no result) %
PLATELET # BLD AUTO: 166 10^3/UL (ref 130–400)
PLATELET # BLD AUTO: 189 10^3/UL (ref 130–400)
PLATELETS CHECKED: YES
PMV BLD AUTO: 9.4 FL (ref 7.4–10.4)
PMV BLD AUTO: 9.5 FL (ref 7.4–10.4)
POIKILOCYTOSIS BLD QL SMEAR: (no result)
POLYCHROMASIA BLD QL SMEAR: (no result)
POTASSIUM SERPL-SCNC: 3.4 MMOL/L (ref 3.5–5.1)
POTASSIUM SERPL-SCNC: 4.5 MMOL/L (ref 3.5–5.1)
PROMYELOCYTES NFR BLD MANUAL: (no result) %
PROT SERPL-MCNC: 8 G/DL (ref 6.3–8.2)
RBC # BLD AUTO: 3.96 10^6/UL (ref 4.2–5.4)
RBC # BLD AUTO: 4.81 10^6/UL (ref 4.2–5.4)
RBC MORPH BLD: YES
ROULEAUX BLD QL SMEAR: (no result)
SCHISTOCYTES BLD QL SMEAR: (no result)
SEGMENTED NEUTROPHILS: 55 % (ref 42–75)
SICKLE CELLS BLD QL SMEAR: (no result)
SMUDGE CELLS BLD QL SMEAR: (no result)
SODIUM SERPL-SCNC: 140 MMOL/L (ref 135–145)
SODIUM SERPL-SCNC: 144 MMOL/L (ref 135–145)
SP GR UR: 1.01 (ref ?–1.03)
SPHEROCYTES BLD QL SMEAR: (no result)
STOMATOCYTES BLD QL SMEAR: (no result)
TARGETS BLD QL SMEAR: (no result)
TOTAL CELLS COUNTED BLD: 100
TOXIC GRANULES BLD QL SMEAR: (no result)
TRANS CELLS UR QL COMP ASSIST: (no result) /LPF
TRI-PHOS CRY URNS QL MICRO: (no result)
URATE CRY URNS QL MICRO: (no result)
URINE ALBUMIN: (no result)
URINE BACTERIA: (no result)
URINE EPITHELIAL CAST: (no result) /LPF
URINE KETONE: NEGATIVE
URINE OTHER: (no result)
URINE RED BLOOD CELL: (no result) /HPF (ref 0–2)
URINE RED CELL CAST: (no result) /LPF
URINE SPERM: (no result)
URINE TRICHOMONAS: (no result)
URINE WHITE CELL CAST: (no result) /LPF
UROBILINOGEN UR QL STRIP.AUTO: NEGATIVE
VACUOLATED SEGS: (no result)
VARIANT LYMPHS NFR BLD MANUAL: (no result) %
WBC # BLD AUTO: 3 10^3/UL (ref 4.8–10.8)
WBC # BLD AUTO: 5 10^3/UL (ref 4.8–10.8)
YEAST #/AREA URNS HPF: (no result) /[HPF]

## 2025-06-10 RX ADMIN — GABAPENTIN: 400 CAPSULE ORAL at 11:24

## 2025-06-10 RX ADMIN — GABAPENTIN 400 MG: 400 CAPSULE ORAL at 18:13

## 2025-06-10 RX ADMIN — SODIUM CHLORIDE, SODIUM LACTATE, POTASSIUM CHLORIDE, AND CALCIUM CHLORIDE 1000: 600; 310; 30; 20 INJECTION, SOLUTION INTRAVENOUS at 07:29

## 2025-06-10 RX ADMIN — DEXAMETHASONE SODIUM PHOSPHATE 4 MG: 4 INJECTION, SOLUTION INTRA-ARTICULAR; INTRALESIONAL; INTRAMUSCULAR; INTRAVENOUS; SOFT TISSUE at 19:37

## 2025-06-10 RX ADMIN — METOCLOPRAMIDE HYDROCHLORIDE 10 MG: 5 INJECTION INTRAMUSCULAR; INTRAVENOUS at 05:29

## 2025-06-10 RX ADMIN — POLYVINYL ALCOHOL, POVIDONE 2 DROPS: 14; 6 SOLUTION/ DROPS OPHTHALMIC at 20:45

## 2025-06-10 RX ADMIN — Medication 250: at 07:46

## 2025-06-10 RX ADMIN — PREDNISOLONE ACETATE 1 DROP: 10 SUSPENSION/ DROPS OPHTHALMIC at 20:45

## 2025-06-10 RX ADMIN — ONDANSETRON HYDROCHLORIDE 4 MG: 2 SOLUTION INTRAMUSCULAR; INTRAVENOUS at 02:36

## 2025-06-10 RX ADMIN — SODIUM CHLORIDE, SODIUM LACTATE, POTASSIUM CHLORIDE, AND CALCIUM CHLORIDE 1000: 600; 310; 30; 20 INJECTION, SOLUTION INTRAVENOUS at 04:52

## 2025-06-10 RX ADMIN — RIVAROXABAN 20 MG: 20 TABLET, FILM COATED ORAL at 18:13

## 2025-06-10 RX ADMIN — SODIUM CHLORIDE, SODIUM LACTATE, POTASSIUM CHLORIDE, AND CALCIUM CHLORIDE 1000: 600; 310; 30; 20 INJECTION, SOLUTION INTRAVENOUS at 06:40

## 2025-06-10 RX ADMIN — HYDROCORTISONE SODIUM SUCCINATE 100 MG: 100 INJECTION, POWDER, FOR SOLUTION INTRAMUSCULAR; INTRAVENOUS at 06:41

## 2025-06-10 RX ADMIN — ACETAMINOPHEN 650 MG: 650 SUPPOSITORY RECTAL at 07:55

## 2025-06-10 RX ADMIN — POLYVINYL ALCOHOL, POVIDONE 2 DROPS: 14; 6 SOLUTION/ DROPS OPHTHALMIC at 18:12

## 2025-06-10 RX ADMIN — POLYVINYL ALCOHOL, POVIDONE 2 DROPS: 14; 6 SOLUTION/ DROPS OPHTHALMIC at 11:39

## 2025-06-10 RX ADMIN — ACETAMINOPHEN 650 MG: 325 TABLET ORAL at 19:36

## 2025-06-10 RX ADMIN — DIPHENHYDRAMINE HYDROCHLORIDE 25 MG: 50 INJECTION, SOLUTION INTRAMUSCULAR; INTRAVENOUS at 05:29

## 2025-06-10 RX ADMIN — GABAPENTIN 400 MG: 400 CAPSULE ORAL at 20:45

## 2025-06-10 RX ADMIN — SODIUM CHLORIDE 1000: 900 INJECTION, SOLUTION INTRAVENOUS at 02:42

## 2025-06-10 RX ADMIN — POLYVINYL ALCOHOL, POVIDONE: 14; 6 SOLUTION/ DROPS OPHTHALMIC at 18:04

## 2025-06-10 RX ADMIN — LATANOPROST 1 DROP: 50 SOLUTION OPHTHALMIC at 20:45

## 2025-06-10 RX ADMIN — POLYVINYL ALCOHOL, POVIDONE: 14; 6 SOLUTION/ DROPS OPHTHALMIC at 11:25

## 2025-06-11 VITALS — DIASTOLIC BLOOD PRESSURE: 68 MMHG | SYSTOLIC BLOOD PRESSURE: 121 MMHG

## 2025-06-11 VITALS — SYSTOLIC BLOOD PRESSURE: 109 MMHG | DIASTOLIC BLOOD PRESSURE: 67 MMHG

## 2025-06-11 VITALS — SYSTOLIC BLOOD PRESSURE: 108 MMHG | DIASTOLIC BLOOD PRESSURE: 65 MMHG

## 2025-06-11 VITALS — SYSTOLIC BLOOD PRESSURE: 111 MMHG | DIASTOLIC BLOOD PRESSURE: 94 MMHG

## 2025-06-11 VITALS — SYSTOLIC BLOOD PRESSURE: 117 MMHG | DIASTOLIC BLOOD PRESSURE: 60 MMHG

## 2025-06-11 VITALS — HEART RATE: 65 BPM

## 2025-06-11 VITALS — DIASTOLIC BLOOD PRESSURE: 68 MMHG | SYSTOLIC BLOOD PRESSURE: 130 MMHG

## 2025-06-11 VITALS — SYSTOLIC BLOOD PRESSURE: 110 MMHG | DIASTOLIC BLOOD PRESSURE: 59 MMHG

## 2025-06-11 VITALS — DIASTOLIC BLOOD PRESSURE: 60 MMHG | SYSTOLIC BLOOD PRESSURE: 100 MMHG

## 2025-06-11 VITALS — SYSTOLIC BLOOD PRESSURE: 122 MMHG | DIASTOLIC BLOOD PRESSURE: 65 MMHG

## 2025-06-11 VITALS — DIASTOLIC BLOOD PRESSURE: 66 MMHG | SYSTOLIC BLOOD PRESSURE: 130 MMHG

## 2025-06-11 VITALS — DIASTOLIC BLOOD PRESSURE: 49 MMHG | SYSTOLIC BLOOD PRESSURE: 97 MMHG

## 2025-06-11 VITALS — DIASTOLIC BLOOD PRESSURE: 60 MMHG | SYSTOLIC BLOOD PRESSURE: 108 MMHG

## 2025-06-11 LAB
BUN SERPL-MCNC: 15 MG/DL (ref 7–17)
CALCIUM SERPL-MCNC: 8.7 MG/DL (ref 8.4–10.2)
CHLORIDE SERPL-SCNC: 114 MMOL/L (ref 98–107)
CO2 SERPL-SCNC: 22 MMOL/L (ref 22–30)
COMMENT: (no result)
CREAT SERPL-MCNC: 0.6 MG/DL (ref 0.6–1)
EGFR: > 60
ERYTHROCYTE [DISTWIDTH] IN BLOOD BY AUTOMATED COUNT: 13.8 % (ref 11.5–14.5)
ESTIMATED CREATININE CLEARANCE: 82 ML/MIN
GLUCOSE SERPL-MCNC: 145 MG/DL (ref 70–99)
HCT VFR BLD AUTO: 36.2 % (ref 37–47)
HGB BLD-MCNC: 12.2 G/DL (ref 12–16)
MAGNESIUM SERPL-MCNC: 2.1 MG/DL (ref 1.6–2.3)
MCH RBC QN AUTO: 33.4 PG (ref 27–31)
MCHC RBC AUTO-ENTMCNC: 33.7 G/DL (ref 33–37)
MCV RBC AUTO: 99.2 FL (ref 81–99)
PLATELET # BLD AUTO: 157 10^3/UL (ref 130–400)
PMV BLD AUTO: 9.4 FL (ref 7.4–10.4)
POTASSIUM SERPL-SCNC: 4.2 MMOL/L (ref 3.5–5.1)
RBC # BLD AUTO: 3.65 10^6/UL (ref 4.2–5.4)
SODIUM SERPL-SCNC: 143 MMOL/L (ref 135–145)
WBC # BLD AUTO: 13.4 10^3/UL (ref 4.8–10.8)

## 2025-06-11 PROCEDURE — 5A09357 ASSISTANCE WITH RESPIRATORY VENTILATION, LESS THAN 24 CONSECUTIVE HOURS, CONTINUOUS POSITIVE AIRWAY PRESSURE: ICD-10-PCS | Performed by: HOSPITALIST

## 2025-06-11 RX ADMIN — BRINZOLAMIDE/BRIMONIDINE TARTRATE 1 DROP: 10; 2 SUSPENSION/ DROPS OPHTHALMIC at 15:48

## 2025-06-11 RX ADMIN — GABAPENTIN 400 MG: 400 CAPSULE ORAL at 22:54

## 2025-06-11 RX ADMIN — GABAPENTIN 100 MG: 100 CAPSULE ORAL at 22:54

## 2025-06-11 RX ADMIN — ACETAMINOPHEN 650 MG: 325 TABLET ORAL at 02:43

## 2025-06-11 RX ADMIN — POLYVINYL ALCOHOL, POVIDONE 2 DROPS: 14; 6 SOLUTION/ DROPS OPHTHALMIC at 22:55

## 2025-06-11 RX ADMIN — BENZONATATE 100 MG: 100 CAPSULE ORAL at 05:09

## 2025-06-11 RX ADMIN — BRINZOLAMIDE/BRIMONIDINE TARTRATE 1 DROP: 10; 2 SUSPENSION/ DROPS OPHTHALMIC at 20:26

## 2025-06-11 RX ADMIN — PREDNISOLONE ACETATE 1 DROP: 10 SUSPENSION/ DROPS OPHTHALMIC at 07:40

## 2025-06-11 RX ADMIN — GABAPENTIN 400 MG: 400 CAPSULE ORAL at 15:47

## 2025-06-11 RX ADMIN — BENZONATATE 100 MG: 100 CAPSULE ORAL at 12:34

## 2025-06-11 RX ADMIN — RIVAROXABAN 20 MG: 20 TABLET, FILM COATED ORAL at 17:49

## 2025-06-11 RX ADMIN — POLYVINYL ALCOHOL, POVIDONE 2 DROPS: 14; 6 SOLUTION/ DROPS OPHTHALMIC at 09:37

## 2025-06-11 RX ADMIN — POLYVINYL ALCOHOL, POVIDONE 2 DROPS: 14; 6 SOLUTION/ DROPS OPHTHALMIC at 12:20

## 2025-06-11 RX ADMIN — ACETAMINOPHEN 650 MG: 325 TABLET ORAL at 12:27

## 2025-06-11 RX ADMIN — LATANOPROST 1 DROP: 50 SOLUTION OPHTHALMIC at 22:56

## 2025-06-11 RX ADMIN — DEXAMETHASONE SODIUM PHOSPHATE 2 MG: 4 INJECTION, SOLUTION INTRA-ARTICULAR; INTRALESIONAL; INTRAMUSCULAR; INTRAVENOUS; SOFT TISSUE at 22:57

## 2025-06-11 RX ADMIN — BENZONATATE 100 MG: 100 CAPSULE ORAL at 20:26

## 2025-06-11 RX ADMIN — GABAPENTIN 400 MG: 400 CAPSULE ORAL at 09:37

## 2025-06-11 RX ADMIN — POLYVINYL ALCOHOL, POVIDONE 2 DROPS: 14; 6 SOLUTION/ DROPS OPHTHALMIC at 15:47

## 2025-06-11 RX ADMIN — POLYVINYL ALCOHOL, POVIDONE 2 DROPS: 14; 6 SOLUTION/ DROPS OPHTHALMIC at 17:49

## 2025-06-11 RX ADMIN — DEXAMETHASONE SODIUM PHOSPHATE 4 MG: 4 INJECTION, SOLUTION INTRA-ARTICULAR; INTRALESIONAL; INTRAMUSCULAR; INTRAVENOUS; SOFT TISSUE at 09:37

## 2025-06-12 VITALS — DIASTOLIC BLOOD PRESSURE: 78 MMHG | SYSTOLIC BLOOD PRESSURE: 135 MMHG

## 2025-06-12 VITALS — DIASTOLIC BLOOD PRESSURE: 71 MMHG | SYSTOLIC BLOOD PRESSURE: 123 MMHG

## 2025-06-12 VITALS — SYSTOLIC BLOOD PRESSURE: 136 MMHG | DIASTOLIC BLOOD PRESSURE: 73 MMHG

## 2025-06-12 VITALS — DIASTOLIC BLOOD PRESSURE: 57 MMHG | SYSTOLIC BLOOD PRESSURE: 107 MMHG

## 2025-06-12 VITALS — SYSTOLIC BLOOD PRESSURE: 104 MMHG | DIASTOLIC BLOOD PRESSURE: 60 MMHG

## 2025-06-12 VITALS — DIASTOLIC BLOOD PRESSURE: 68 MMHG | SYSTOLIC BLOOD PRESSURE: 139 MMHG

## 2025-06-12 VITALS — SYSTOLIC BLOOD PRESSURE: 103 MMHG | DIASTOLIC BLOOD PRESSURE: 86 MMHG

## 2025-06-12 VITALS — HEART RATE: 68 BPM

## 2025-06-12 LAB
BUN SERPL-MCNC: 12 MG/DL (ref 7–17)
CALCIUM SERPL-MCNC: 9.2 MG/DL (ref 8.4–10.2)
CHLORIDE SERPL-SCNC: 113 MMOL/L (ref 98–107)
CO2 SERPL-SCNC: 23 MMOL/L (ref 22–30)
COMMENT: (no result)
CREAT SERPL-MCNC: 0.7 MG/DL (ref 0.6–1)
EGFR: > 60
ERYTHROCYTE [DISTWIDTH] IN BLOOD BY AUTOMATED COUNT: 13.9 % (ref 11.5–14.5)
ESTIMATED CREATININE CLEARANCE: 70 ML/MIN
GLUCOSE SERPL-MCNC: 153 MG/DL (ref 70–99)
HCT VFR BLD AUTO: 35 % (ref 37–47)
HGB BLD-MCNC: 11.8 G/DL (ref 12–16)
MCH RBC QN AUTO: 33.5 PG (ref 27–31)
MCHC RBC AUTO-ENTMCNC: 33.7 G/DL (ref 33–37)
MCV RBC AUTO: 99.4 FL (ref 81–99)
PLATELET # BLD AUTO: 171 10^3/UL (ref 130–400)
PMV BLD AUTO: 9.9 FL (ref 7.4–10.4)
POTASSIUM SERPL-SCNC: 4.3 MMOL/L (ref 3.5–5.1)
RBC # BLD AUTO: 3.52 10^6/UL (ref 4.2–5.4)
SODIUM SERPL-SCNC: 142 MMOL/L (ref 135–145)
WBC # BLD AUTO: 11.8 10^3/UL (ref 4.8–10.8)

## 2025-06-12 RX ADMIN — POLYVINYL ALCOHOL, POVIDONE: 14; 6 SOLUTION/ DROPS OPHTHALMIC at 15:00

## 2025-06-12 RX ADMIN — BRINZOLAMIDE/BRIMONIDINE TARTRATE 1 DROP: 10; 2 SUSPENSION/ DROPS OPHTHALMIC at 10:20

## 2025-06-12 RX ADMIN — DEXAMETHASONE SODIUM PHOSPHATE: 4 INJECTION, SOLUTION INTRA-ARTICULAR; INTRALESIONAL; INTRAMUSCULAR; INTRAVENOUS; SOFT TISSUE at 11:06

## 2025-06-12 RX ADMIN — POLYVINYL ALCOHOL, POVIDONE 2 DROPS: 14; 6 SOLUTION/ DROPS OPHTHALMIC at 11:33

## 2025-06-12 RX ADMIN — PREDNISOLONE ACETATE 1 DROP: 10 SUSPENSION/ DROPS OPHTHALMIC at 10:20

## 2025-06-12 RX ADMIN — ACETAMINOPHEN 650 MG: 325 TABLET ORAL at 03:07

## 2025-06-12 RX ADMIN — POLYVINYL ALCOHOL, POVIDONE 2 DROPS: 14; 6 SOLUTION/ DROPS OPHTHALMIC at 10:19

## 2025-06-12 RX ADMIN — METOPROLOL SUCCINATE 25 MG: 25 TABLET, FILM COATED, EXTENDED RELEASE ORAL at 10:19

## 2025-06-12 RX ADMIN — GABAPENTIN 400 MG: 400 CAPSULE ORAL at 10:19

## 2025-06-12 RX ADMIN — PREDNISONE 5 MG: 5 TABLET ORAL at 11:33

## 2025-06-12 RX ADMIN — PANTOPRAZOLE SODIUM 40 MG: 40 TABLET, DELAYED RELEASE ORAL at 10:19
